# Patient Record
Sex: MALE | Race: WHITE | NOT HISPANIC OR LATINO | Employment: FULL TIME | URBAN - METROPOLITAN AREA
[De-identification: names, ages, dates, MRNs, and addresses within clinical notes are randomized per-mention and may not be internally consistent; named-entity substitution may affect disease eponyms.]

---

## 2018-09-11 DIAGNOSIS — Z00.00 ROUTINE GENERAL MEDICAL EXAMINATION AT A HEALTH CARE FACILITY: Primary | ICD-10-CM

## 2018-09-27 ENCOUNTER — OFFICE VISIT (OUTPATIENT)
Dept: PULMONOLOGY | Facility: CLINIC | Age: 28
End: 2018-09-27
Payer: COMMERCIAL

## 2018-09-27 ENCOUNTER — HOSPITAL ENCOUNTER (OUTPATIENT)
Dept: RADIOLOGY | Facility: HOSPITAL | Age: 28
Discharge: HOME | End: 2018-09-27
Attending: INTERNAL MEDICINE
Payer: COMMERCIAL

## 2018-09-27 ENCOUNTER — CLINICAL SUPPORT (OUTPATIENT)
Dept: INTERNAL MEDICINE | Facility: CLINIC | Age: 28
End: 2018-09-27
Payer: COMMERCIAL

## 2018-09-27 ENCOUNTER — HOSPITAL ENCOUNTER (OUTPATIENT)
Dept: CARDIOLOGY | Facility: CLINIC | Age: 28
Discharge: HOME | End: 2018-09-27
Payer: COMMERCIAL

## 2018-09-27 VITALS
HEIGHT: 70 IN | BODY MASS INDEX: 26.34 KG/M2 | WEIGHT: 184 LBS | SYSTOLIC BLOOD PRESSURE: 121 MMHG | DIASTOLIC BLOOD PRESSURE: 72 MMHG | HEART RATE: 76 BPM

## 2018-09-27 DIAGNOSIS — Z00.00 ANNUAL PHYSICAL EXAM: Primary | ICD-10-CM

## 2018-09-27 DIAGNOSIS — Z00.00 ROUTINE GENERAL MEDICAL EXAMINATION AT A HEALTH CARE FACILITY: ICD-10-CM

## 2018-09-27 DIAGNOSIS — Z00.00 ROUTINE GENERAL MEDICAL EXAMINATION AT A HEALTH CARE FACILITY: Primary | ICD-10-CM

## 2018-09-27 LAB
ALBUMIN SERPL BCP-MCNC: 4.2 G/DL
ALP SERPL-CCNC: 69 U/L
ALT SERPL W/O P-5'-P-CCNC: 32 U/L
ANION GAP SERPL CALC-SCNC: 6 MMOL/L
AST SERPL-CCNC: 25 U/L
BILIRUB SERPL-MCNC: 0.6 MG/DL
BUN SERPL-MCNC: 14 MG/DL
CALCIUM SERPL-MCNC: 10 MG/DL
CHLORIDE SERPL-SCNC: 105 MMOL/L
CHOLEST SERPL-MCNC: 193 MG/DL
CHOLEST/HDLC SERPL: 3.1 {RATIO}
CO2 SERPL-SCNC: 30 MMOL/L
CREAT SERPL-MCNC: 1 MG/DL
ERYTHROCYTE [DISTWIDTH] IN BLOOD BY AUTOMATED COUNT: 12 %
EST. GFR  (AFRICAN AMERICAN): >60 ML/MIN/1.73 M^2
EST. GFR  (NON AFRICAN AMERICAN): >60 ML/MIN/1.73 M^2
ESTIMATED AVG GLUCOSE: 97 MG/DL
GLUCOSE SERPL-MCNC: 100 MG/DL
HBA1C MFR BLD HPLC: 5 %
HCT VFR BLD AUTO: 41.7 %
HDLC SERPL-MCNC: 63 MG/DL
HDLC SERPL: 32.6 %
HGB BLD-MCNC: 14 G/DL
HIV 1+2 AB+HIV1 P24 AG SERPL QL IA: NEGATIVE
LDLC SERPL CALC-MCNC: 119.2 MG/DL
MCH RBC QN AUTO: 30.4 PG
MCHC RBC AUTO-ENTMCNC: 33.6 G/DL
MCV RBC AUTO: 91 FL
NONHDLC SERPL-MCNC: 130 MG/DL
PLATELET # BLD AUTO: 217 K/UL
PMV BLD AUTO: 11.6 FL
POTASSIUM SERPL-SCNC: 4.7 MMOL/L
PROT SERPL-MCNC: 7.2 G/DL
RBC # BLD AUTO: 4.61 M/UL
SODIUM SERPL-SCNC: 141 MMOL/L
TRIGL SERPL-MCNC: 54 MG/DL
TSH SERPL DL<=0.005 MIU/L-ACNC: 1.96 UIU/ML
WBC # BLD AUTO: 5.77 K/UL

## 2018-09-27 PROCEDURE — 93000 ELECTROCARDIOGRAM COMPLETE: CPT | Mod: S$GLB,,, | Performed by: INTERNAL MEDICINE

## 2018-09-27 PROCEDURE — 80061 LIPID PANEL: CPT

## 2018-09-27 PROCEDURE — 71046 X-RAY EXAM CHEST 2 VIEWS: CPT | Mod: TC,FY

## 2018-09-27 PROCEDURE — 71046 X-RAY EXAM CHEST 2 VIEWS: CPT | Mod: 26,,, | Performed by: RADIOLOGY

## 2018-09-27 PROCEDURE — 99999 PR PBB SHADOW E&M-EST. PATIENT-LVL III: CPT | Mod: PBBFAC,,, | Performed by: INTERNAL MEDICINE

## 2018-09-27 PROCEDURE — 99385 PREV VISIT NEW AGE 18-39: CPT | Mod: S$GLB,,, | Performed by: INTERNAL MEDICINE

## 2018-09-27 PROCEDURE — 86703 HIV-1/HIV-2 1 RESULT ANTBDY: CPT

## 2018-09-27 PROCEDURE — 83036 HEMOGLOBIN GLYCOSYLATED A1C: CPT

## 2018-09-27 PROCEDURE — 85027 COMPLETE CBC AUTOMATED: CPT

## 2018-09-27 PROCEDURE — 80053 COMPREHEN METABOLIC PANEL: CPT

## 2018-09-27 PROCEDURE — 84443 ASSAY THYROID STIM HORMONE: CPT

## 2018-09-27 NOTE — LETTER
September 27, 2018    Stephan Schwartz  [address] [facility] [address]             [facility] - Pulmonary Services  [address]  [place] [address]  Phone: [phone] Dear Stephan,        Thank you for allowing me to serve you and perform your Executive Health exam on 9/27/2018. This letter will serve as a brief summary of the physical findings and laboratory/studies performed and recommendations at this time. Today's assessment is normal in all respects. Tell your fiance about her physical with [facility].  Read the Book.         If you have any questions or concerns, please don't hesitate to call.    Sincerely,        Bhargav Wilson MD

## 2018-09-27 NOTE — PROGRESS NOTES
Subjective:       Patient ID: Stephan Schwartz is a 27 y.o. male.    Chief Complaint: Annual Exam    HPI  28 yo [employer] employee works in [employer] in [place]. Went to high school in [place], and later [employer] and graduated in [place]. He feels well, exercises regularly, takes no mediations and has never been hospitalized.   Review of Systems   Constitutional: Negative.    HENT: Negative.    Eyes: Negative.    Respiratory: Negative.    Cardiovascular: Negative.    Gastrointestinal: Negative.    Genitourinary: Negative.    Musculoskeletal: Negative.    Skin: Negative.    Neurological: Negative.    Psychiatric/Behavioral: Negative.    All other systems reviewed and are negative.      Objective:      Physical Exam   Constitutional: He is oriented to person, place, and time. He appears well-developed and well-nourished.   HENT:   Head: Normocephalic and atraumatic.   Right Ear: External ear normal.   Left Ear: External ear normal.   Eyes: Conjunctivae and EOM are normal. Pupils are equal, round, and reactive to light.   Neck: Normal range of motion. Neck supple.   Cardiovascular: Normal rate, regular rhythm and normal heart sounds.   Pulmonary/Chest: Effort normal and breath sounds normal.   Peak flow 600 l/min   Abdominal: Soft. Bowel sounds are normal.   Musculoskeletal: Normal range of motion.   Neurological: He is alert and oriented to person, place, and time. He has normal reflexes.   Skin: Skin is warm and dry.   Psychiatric: He has a normal mood and affect. His behavior is normal. Judgment and thought content normal.       Assessment:       No diagnosis found.    Plan:           Labs: All parameters are normal. EKG is normal an d Chest x-ray is clear. IMP: Healthy Male with good health habits.

## 2019-05-07 DIAGNOSIS — Z00.00 ROUTINE GENERAL MEDICAL EXAMINATION AT A HEALTH CARE FACILITY: Primary | ICD-10-CM

## 2019-06-28 ENCOUNTER — CLINICAL SUPPORT (OUTPATIENT)
Dept: INTERNAL MEDICINE | Facility: CLINIC | Age: 29
End: 2019-06-28
Payer: COMMERCIAL

## 2019-06-28 ENCOUNTER — OFFICE VISIT (OUTPATIENT)
Dept: INTERNAL MEDICINE | Facility: CLINIC | Age: 29
End: 2019-06-28
Payer: COMMERCIAL

## 2019-06-28 ENCOUNTER — HOSPITAL ENCOUNTER (OUTPATIENT)
Dept: CARDIOLOGY | Facility: CLINIC | Age: 29
Discharge: HOME | End: 2019-06-28
Payer: COMMERCIAL

## 2019-06-28 VITALS
WEIGHT: 187.19 LBS | SYSTOLIC BLOOD PRESSURE: 120 MMHG | TEMPERATURE: 98 F | DIASTOLIC BLOOD PRESSURE: 78 MMHG | HEART RATE: 73 BPM | BODY MASS INDEX: 27.73 KG/M2 | HEIGHT: 69 IN

## 2019-06-28 DIAGNOSIS — Z00.00 ANNUAL PHYSICAL EXAM: Primary | ICD-10-CM

## 2019-06-28 DIAGNOSIS — Z00.00 ROUTINE GENERAL MEDICAL EXAMINATION AT A HEALTH CARE FACILITY: Primary | ICD-10-CM

## 2019-06-28 DIAGNOSIS — Z00.00 ROUTINE GENERAL MEDICAL EXAMINATION AT A HEALTH CARE FACILITY: ICD-10-CM

## 2019-06-28 LAB
ALBUMIN SERPL BCP-MCNC: 4.3 G/DL (ref 3.5–5.2)
ALP SERPL-CCNC: 72 U/L (ref 55–135)
ALT SERPL W/O P-5'-P-CCNC: 27 U/L (ref 10–44)
ANION GAP SERPL CALC-SCNC: 7 MMOL/L (ref 8–16)
AST SERPL-CCNC: 19 U/L (ref 10–40)
BILIRUB SERPL-MCNC: 0.4 MG/DL (ref 0.1–1)
BUN SERPL-MCNC: 16 MG/DL (ref 6–20)
CALCIUM SERPL-MCNC: 10.3 MG/DL (ref 8.7–10.5)
CHLORIDE SERPL-SCNC: 106 MMOL/L (ref 95–110)
CHOLEST SERPL-MCNC: 210 MG/DL (ref 120–199)
CHOLEST/HDLC SERPL: 3.4 {RATIO} (ref 2–5)
CO2 SERPL-SCNC: 32 MMOL/L (ref 23–29)
CREAT SERPL-MCNC: 1.1 MG/DL (ref 0.5–1.4)
ERYTHROCYTE [DISTWIDTH] IN BLOOD BY AUTOMATED COUNT: 12.1 % (ref 11.5–14.5)
EST. GFR  (AFRICAN AMERICAN): >60 ML/MIN/1.73 M^2
EST. GFR  (NON AFRICAN AMERICAN): >60 ML/MIN/1.73 M^2
ESTIMATED AVG GLUCOSE: 97 MG/DL (ref 68–131)
GLUCOSE SERPL-MCNC: 111 MG/DL (ref 70–110)
HBA1C MFR BLD HPLC: 5 % (ref 4–5.6)
HCT VFR BLD AUTO: 43 % (ref 40–54)
HDLC SERPL-MCNC: 62 MG/DL (ref 40–75)
HDLC SERPL: 29.5 % (ref 20–50)
HGB BLD-MCNC: 14.2 G/DL (ref 14–18)
LDLC SERPL CALC-MCNC: 131.4 MG/DL (ref 63–159)
MCH RBC QN AUTO: 30.7 PG (ref 27–31)
MCHC RBC AUTO-ENTMCNC: 33 G/DL (ref 32–36)
MCV RBC AUTO: 93 FL (ref 82–98)
NONHDLC SERPL-MCNC: 148 MG/DL
PLATELET # BLD AUTO: 215 K/UL (ref 150–350)
PMV BLD AUTO: 11.9 FL (ref 9.2–12.9)
POTASSIUM SERPL-SCNC: 4.9 MMOL/L (ref 3.5–5.1)
PROT SERPL-MCNC: 7.3 G/DL (ref 6–8.4)
RBC # BLD AUTO: 4.62 M/UL (ref 4.6–6.2)
SODIUM SERPL-SCNC: 145 MMOL/L (ref 136–145)
TRIGL SERPL-MCNC: 83 MG/DL (ref 30–150)
TSH SERPL DL<=0.005 MIU/L-ACNC: 2.65 UIU/ML (ref 0.4–4)
WBC # BLD AUTO: 6.76 K/UL (ref 3.9–12.7)

## 2019-06-28 PROCEDURE — 80053 COMPREHEN METABOLIC PANEL: CPT

## 2019-06-28 PROCEDURE — 36415 COLL VENOUS BLD VENIPUNCTURE: CPT

## 2019-06-28 PROCEDURE — 84443 ASSAY THYROID STIM HORMONE: CPT

## 2019-06-28 PROCEDURE — 93005 ELECTROCARDIOGRAM TRACING: CPT | Mod: PBBFAC | Performed by: INTERNAL MEDICINE

## 2019-06-28 PROCEDURE — 99385 PREV VISIT NEW AGE 18-39: CPT | Mod: S$GLB,,, | Performed by: INTERNAL MEDICINE

## 2019-06-28 PROCEDURE — 83036 HEMOGLOBIN GLYCOSYLATED A1C: CPT

## 2019-06-28 PROCEDURE — 99385 PR PREVENTIVE VISIT,NEW,18-39: ICD-10-PCS | Mod: S$GLB,,, | Performed by: INTERNAL MEDICINE

## 2019-06-28 PROCEDURE — 93010 EKG 12-LEAD: ICD-10-PCS | Mod: S$PBB,,, | Performed by: INTERNAL MEDICINE

## 2019-06-28 PROCEDURE — 93010 ELECTROCARDIOGRAM REPORT: CPT | Mod: S$PBB,,, | Performed by: INTERNAL MEDICINE

## 2019-06-28 PROCEDURE — 97750 PR PHYSICAL PERFORMANCE TEST: ICD-10-PCS | Mod: S$GLB,,, | Performed by: INTERNAL MEDICINE

## 2019-06-28 PROCEDURE — 85027 COMPLETE CBC AUTOMATED: CPT

## 2019-06-28 PROCEDURE — 99999 PR PBB SHADOW E&M-EST. PATIENT-LVL III: ICD-10-PCS | Mod: PBBFAC,,, | Performed by: INTERNAL MEDICINE

## 2019-06-28 PROCEDURE — 97802 MEDICAL NUTRITION INDIV IN: CPT | Mod: S$GLB,,, | Performed by: INTERNAL MEDICINE

## 2019-06-28 PROCEDURE — 80061 LIPID PANEL: CPT

## 2019-06-28 PROCEDURE — 99999 PR PBB SHADOW E&M-EST. PATIENT-LVL III: CPT | Mod: PBBFAC,,, | Performed by: INTERNAL MEDICINE

## 2019-06-28 PROCEDURE — 97750 PHYSICAL PERFORMANCE TEST: CPT | Mod: S$GLB,,, | Performed by: INTERNAL MEDICINE

## 2019-06-28 PROCEDURE — 97802 PR MED NUTR THER, 1ST, INDIV, EA 15 MIN: ICD-10-PCS | Mod: S$GLB,,, | Performed by: INTERNAL MEDICINE

## 2019-06-28 NOTE — PROGRESS NOTES
"Subjective:       Patient ID: Stephan Schwartz is a 28 y.o. male.    Chief Complaint: No chief complaint on file.    HPI   Pt. Has no significant cardiovascular or pulmonary history.    Physical Limitations:  None.      Current exercise routine:  Patient currently boxes for an hour, 4 days a week.  Patient runs for a minimum of 30 minutes, 2 days a week.  Patient rows for 30 minutes, 2 days a week.  Patient performs full-body resistance training exercises, 2 days a week.  Patient does not follow any formal flexibility routine at the current time.    Goals:  Patient would like to maintain his current weight and body fat %.    Fun Facts:  Patient was very friendly and engaged.  Patient stated that he does not follow any formal routine but is clearly very active and stated that he "sweats every day of the week".  Patient was receptive to all recommendations made.      Review of Systems    Objective:     The fitness evaluation results are as follows:  D.O.S. 6/28/2019   Height (in): 69.5   Weight (lbs): 185   BMI: 26.025159   Body Fat (%): 14.51   Waist (cm): 89   Hip (cm): 104   WHR: 0.86   RBP (mmHg): 136/92   RHR (bpm): 62    Strength R (lbs)t: 111.29246    Strength Lt (lbs): 106.53499   Push-up Assessment: 56   Curl-up Assessment: 75   Flexibility Testing (cm): 17   REE (kcals): 2670       Physical Exam    Assessment:     Age/gender stratified assessment:  Resting BP: Elevated   Body Fat %: Excellent   WHR Risk Factor: Low Risk    Strength R: Average    Strength L: Average   Upper Body Endurance: Excellent   Abdominal Endurance: Well Above Average   Lower body Flexibiltiy: Needs Improvement       1. Routine general medical examination at a health care facility        Plan:       Recommended fitness guidelines:    -150 minutes of moderate intensity aerobic exercise per week or 75 minutes of vigorous intensity aerobic exercise per week.    -2 to 4 days per week of resistance training for each muscle group. "      -Daily stretching with a hold of at least 30 seconds per muscle group.  Practice the seated hamstring stretch, demonstrated during the evaluation, daily.

## 2019-06-28 NOTE — PROGRESS NOTES
"Nutrition Assessment  Client name:  Stephan Schwartz  :  1990  Age:  28 y.o.  Gender:  male    Client states:  Very pleasant [employer] employee here for his annual Executive Health physical.  Single although engaged.  Has an unremarkable PMH and no medical complaints this morning.  Takes no routine rx medications or supplements daily.  Noted that he did not fast for the recommended duration last night, which he was told may have affected his FBS this morning.  Recalls previous blood draws, in which his cholesterol was borderline high.  Questions etiology, noting weekly intake of fatty meat in particular.  Typically skips breakfast followed by a grilled chicken salad for lunch, almonds for mid-afternoon snack, home cooked meal for dinner, and fruit for night time snack.  Fiance prepares the majority of their meals at night and includes vegetables with most, if not all meals.  Indulges in alcohol with dinner, inquiring about recommended serving sizes.  Also, struggles with his sweet tooth, sharing that he has tried multiple healthier alternatives, such as Halo Top ice cream, although recently switched to fruit.  Believes, however, that he may eat too much fruit, referencing his intake to the equivalence of three bananas.  Adds that his caffeine intake may be considered "excessive" as he consumes 4 Yeti tumblers of coffee daily.  Commutes to [place] for work and so, "needs" coffee for his drive there and back in order to stay awake and alert.  Inquired about recommended oil intake as he and his fiance cook mostly with olive oil.  Regarding exercise, performs various cardio and strength training activities 4x/week on average although admits that his exercise consistency has been less this year due to life events.  Overall, desires to improve lipid panel via improved food and beverage choices.      Anthropometrics  Height:  5' 9.5"     Weight:  185#  BMI:  26.9  % Body Fat:  14.51%    Clinical Signs/Symptoms  N/V/D:  " "None  Appetite (Good, Fair, or Poor):  Good      No past medical history on file.    No past surgical history on file.    Medications    currently has no medications in their medication list.    Vitamins, Minerals, and/or Supplements:  None     Food/Medication Interactions:  Reviewed     Food Allergies or Intolerances:  NKFA     Social History    Marital status:  Engaged  Employment:  [employer]    Social History     Tobacco Use    Smoking status: Never Smoker    Smokeless tobacco: Never Used   Substance Use Topics    Alcohol use: Yes     Frequency: 2-4 times a month     Comment: occasional        Lab Reports   Total Cholesterol:  210    Triglycerides:  83  HDL:  62  LDL:  131.4   Glucose:  111  HbA1c:  5%  BP:  120/78     Food History  Breakfast:  Coffee/skips  Mid-morning Snack:  None  Lunch:  Salad with grilled chicken + water or unsweet tea  Mid-afternoon Snack:  +/- Almonds  Dinner:  Asian chicken medley + [place] sprouts + alcohol (ex. liquor or wine)  H.S. Snack:  Fruit  *Fluid intake:  ~ 48 oz coffee, water, unsweet tea, alcohol    Exercise History:  ~60 minutes various cardio and strength training activities 4x/week    Cultural/Spiritual/Personal Preferences:  None identified    Support System:  [facility]     State of Change:  Contemplation    Barriers to Change:  None    Diagnosis    Altered nutrition-related laboratory values related to improper food and beverage choices as evidenced by TC:  210; LDL:  131.4.    Intervention    RMR (Method:  Body Crawford):  2670 kcal  Activity Factor:  1.3  JASON:  3471 - 250 = 3221 kcal    Goals:  1.  TC < 200; LDL < 130  2.  Maintain a healthy body weight  3.  Select mostly lean meats, such as lean ground beef, "loin" and "round" cuts of pork and beef, flank steak, center cut pork chops, chicken, etc.  4.  Increase vegetable intake to 1/2 plate with dinner  5.  Limit alcohol intake to no more than 2 drinks/day (1 drink = 1.5 oz liquor, 5 oz wine, 12 oz beer)  6.  Reduce caffeine " intake to 3 tumblers/day, eventually decreasing to 2 tumblers/day or less    Nutrition Education  Reviewed CMP, lipid panel, and HbA1c, noting borderline high TC and LDL in particular, which may be attributed to food choices.  Noted borderline high FBS, although accompanied by optimal HbA1c, indicating a low risk for DM.  Explained such to patient in addition to discussing heart-healthy eating, including foods recommended and not, food sources of cholesterol, different types of fats and food sources of each, benefits of physical activity, potential health consequences of hyperlipidemia, etc.  Reviewed list of lean meats within [facility] Meal Planning Guide, encouraging selection of such in place of fatty meats.  Furthermore, addressed patient's concerns regarding sugar and caffeine intake, encouraging avoidance of meal skipping and increased vegetable intake with dinner for added fiber, satiety, and reduction in night time snacking.  Provided healthier dessert alternatives per patient request.  Reviewed recommended caffeine intake and the potential health consequences of excessive intake, encouraging gradual reduction to 2 tumblers/day or less.  Answered patient's questions regarding oil and alcohol intake, advising limit to 2 drinks/day or less.  Reviewed recommended serving sizes of various alcoholic beverages.  Offered patient additional handouts regarding a heart-healthy lifestyle although patient politely declined, stating his commitment to the goals listed above.    Patient verbalized understanding of nutrition education and recommendations received.    Handouts Provided  Meal Planning Guide  Restaurant Guide  Eat Fit Shopping List  Eat Fit Beatriz  Fast Food Guide  Vitamin/Mineral Guide    Monitoring/Evaluation    Monitor the following:  Weight  BMI  Caloric and fluid intake  Lipid panel    Follow Up Plan:  Communication with referring healthcare provider is unnecessary at this time as patient presented as part  of annual wellness exam.  However, will follow up with patient in 1-2 years.

## 2019-06-28 NOTE — LETTER
June 28, 2019    Stephan Schwartz  [address] [facility] [address]             [facility] - Internal Medicine  [address]  [place] [address]  Phone: [phone]  Fax: [phone] Dear Mr. Schwartz:    Thank you for allowing me to serve you and perform your Executive Health exam on 6/28/2019.  This letter will serve a brief summary of the history, physical findings, and laboratory/studies performed and recommendations at that time.    Reason for Visit: Executive Health Preventive Physical Examination    History reviewed. No pertinent past medical history.    History reviewed. No pertinent surgical history.    Family History   Problem Relation Age of Onset    No Known Problems Mother     No Known Problems Father        Social History     Socioeconomic History    Marital status: Single     Spouse name: Not on file    Number of children: Not on file    Years of education: Not on file    Highest education level: Not on file   Occupational History    Occupation: finance   Social Needs    Financial resource strain: Not on file    Food insecurity:     Worry: Not on file     Inability: Not on file    Transportation needs:     Medical: Not on file     Non-medical: Not on file   Tobacco Use    Smoking status: Never Smoker    Smokeless tobacco: Never Used   Substance and Sexual Activity    Alcohol use: Yes     Frequency: 2-4 times a month     Comment: occasional    Drug use: Never    Sexual activity: Not on file   Lifestyle    Physical activity:     Days per week: Not on file     Minutes per session: Not on file    Stress: Not on file   Relationships    Social connections:     Talks on phone: Not on file     Gets together: Not on file     Attends Ep[employer]alian service: Not on file     Active member of club or organization: Not on file     Attends meetings of clubs or organizations: Not on file     Relationship status: Not on file   Other Topics Concern    Not on file   Social History Narrative     Exercises regularly - body weights, aerobics; doing something daily.         Review of patient's allergies indicates:  No Known Allergies    No current outpatient medications on file.     Review of Systems  Review of Systems - Negative    Physical Exam:  General: General appearance: alert, well appearing, and in no distress.   Skin: Skin exam - normal coloration and turgor, no rashes, no suspicious skin lesions noted.  HEENT: Ears - bilateral TM's and external ear canals normal. , ENT exam reveals - ENT exam normal, no neck nodes or sinus tenderness.   Lungs: Chest: clear to auscultation, no wheezes, rales or rhonchi, symmetric air entry.   Heart: CVS exam: normal rate, regular rhythm, normal S1, S2, no murmurs, rubs, clicks or gallops.   Extremities: Exam of extremities: peripheral pulses normal, no pedal edema, no clubbing or cyanosis    Labs:  Results for orders placed or performed in visit on 06/28/19   Comprehensive metabolic panel   Result Value Ref Range    Sodium 145 136 - 145 mmol/L    Potassium 4.9 3.5 - 5.1 mmol/L    Chloride 106 95 - 110 mmol/L    CO2 32 (H) 23 - 29 mmol/L    Glucose 111 (H) 70 - 110 mg/dL    BUN, Bld 16 6 - 20 mg/dL    Creatinine 1.1 0.5 - 1.4 mg/dL    Calcium 10.3 8.7 - 10.5 mg/dL    Total Protein 7.3 6.0 - 8.4 g/dL    Albumin 4.3 3.5 - 5.2 g/dL    Total Bilirubin 0.4 0.1 - 1.0 mg/dL    Alkaline Phosphatase 72 55 - 135 U/L    AST 19 10 - 40 U/L    ALT 27 10 - 44 U/L    Anion Gap 7 (L) 8 - 16 mmol/L    eGFR if African American >60.0 >60 mL/min/1.73 m^2    eGFR if non African American >60.0 >60 mL/min/1.73 m^2   CBC Without Differential   Result Value Ref Range    WBC 6.76 3.90 - 12.70 K/uL    RBC 4.62 4.60 - 6.20 M/uL    Hemoglobin 14.2 14.0 - 18.0 g/dL    Hematocrit 43.0 40.0 - 54.0 %    Mean Corpuscular Volume 93 82 - 98 fL    Mean Corpuscular Hemoglobin 30.7 27.0 - 31.0 pg    Mean Corpuscular Hemoglobin Conc 33.0 32.0 - 36.0 g/dL    RDW 12.1 11.5 - 14.5 %    Platelets 215 150 - 350  "K/uL    MPV 11.9 9.2 - 12.9 fL   Lipid panel   Result Value Ref Range    Cholesterol 210 (H) 120 - 199 mg/dL    Triglycerides 83 30 - 150 mg/dL    HDL 62 40 - 75 mg/dL    LDL Cholesterol 131.4 63.0 - 159.0 mg/dL    Hdl/Cholesterol Ratio 29.5 20.0 - 50.0 %    Total Cholesterol/HDL Ratio 3.4 2.0 - 5.0    Non-HDL Cholesterol 148 mg/dL   Hemoglobin A1c   Result Value Ref Range    Hemoglobin A1C 5.0 4.0 - 5.6 %    Estimated Avg Glucose 97 68 - 131 mg/dL   TSH   Result Value Ref Range    TSH 2.646 0.400 - 4.000 uIU/mL        Assessment/Recommendations:  Routine Health Maintenance    At this time, you appear to be in good medical condition.  As discussed, your "fasting" sugar and cholesterol were probably high due to not fasting the full 10 hours prior to the lab test.  Your a1c, average sugar over 3 months, is in the normal range.  Keep exercising and watching diet!    If you have any questions or concerns, please don't hesitate to call.    Sincerely,          Shirley Rockwell MD     "

## 2019-06-28 NOTE — PROGRESS NOTES
"INTERNAL MEDICINE INITIAL VISIT NOTE      CHIEF COMPLAINT     Chief Complaint   Patient presents with    [facility]       HPI     Stephan Schwartz is a 28 y.o. C male who presents for [employer].    No complaints.     Past Medical History:  History reviewed. No pertinent past medical history.    Past Surgical History:  History reviewed. No pertinent surgical history.    Allergies:  Review of patient's allergies indicates:  No Known Allergies    Home Medications:  Prior to Admission medications    Not on File       Family History:  Family History   Problem Relation Age of Onset    No Known Problems Mother     No Known Problems Father        Social History:  Social History     Tobacco Use    Smoking status: Never Smoker    Smokeless tobacco: Never Used   Substance Use Topics    Alcohol use: Yes     Frequency: 2-4 times a month     Comment: occasional    Drug use: Never       Review of Systems:  Review of Systems Comprehensive review of systems otherwise negative. See history/subjective section for more details.    Health Maintainence:   Td - thinks w/in last 10 yrs.     PHYSICAL EXAM     /78 (BP Location: Left arm, Patient Position: Sitting, BP Method: Large (Manual))   Pulse 73   Temp 98.4 °F (36.9 °C)   Ht 5' 9" (1.753 m)   Wt 84.9 kg (187 lb 2.7 oz)   BMI 27.64 kg/m²     GEN - A+OX4, NAD   HEENT - PERRL, EOMI, OP clear. MMM.   Neck - No thyromegaly or cervical LAD. No thyroid masses felt.  CV - RRR, no m/r   Chest - CTAB, no wheezing or rhonchi  Abd - S/NT/ND/+BS.   Ext - 2+BDP and radial pulses. No LE edema.   Neuro - 5/5 BUE and BLE strength. Sensation to light touch intact throughout. 2+ DTRs. Normal gait.   MSK - No spinal tenderness to palpation. Normal gait.   Skin - No rash.    LABS     Previous labs reviewed.    ASSESSMENT/PLAN     Stephan Schwartz is a 28 y.o. male with  Stephan was seen today for 1010data.    Diagnoses and all orders for this visit:    Annual physical " exam  Not truly fasting for full 10 hours for test, which likely accounts for the 111 for glucose and mildly elevated total cholesterol. rec continuing exercise.       RTC in 12 months, sooner if needed and depending on labs.    Shirley Rockwell MD  Department of Internal Medicine - [facility]  9:18 AM

## 2020-06-02 DIAGNOSIS — Z00.00 ROUTINE GENERAL MEDICAL EXAMINATION AT A HEALTH CARE FACILITY: Primary | ICD-10-CM

## 2020-08-28 ENCOUNTER — CLINICAL SUPPORT (OUTPATIENT)
Dept: INTERNAL MEDICINE | Facility: CLINIC | Age: 30
End: 2020-08-28
Payer: COMMERCIAL

## 2020-08-28 ENCOUNTER — OFFICE VISIT (OUTPATIENT)
Dept: INTERNAL MEDICINE | Facility: CLINIC | Age: 30
End: 2020-08-28
Payer: COMMERCIAL

## 2020-08-28 ENCOUNTER — HOSPITAL ENCOUNTER (OUTPATIENT)
Dept: CARDIOLOGY | Facility: CLINIC | Age: 30
Discharge: HOME | End: 2020-08-28
Payer: COMMERCIAL

## 2020-08-28 VITALS
WEIGHT: 188.69 LBS | HEART RATE: 61 BPM | SYSTOLIC BLOOD PRESSURE: 128 MMHG | BODY MASS INDEX: 27.01 KG/M2 | DIASTOLIC BLOOD PRESSURE: 84 MMHG | HEIGHT: 70 IN | TEMPERATURE: 98 F

## 2020-08-28 DIAGNOSIS — Z00.00 ROUTINE GENERAL MEDICAL EXAMINATION AT A HEALTH CARE FACILITY: Primary | ICD-10-CM

## 2020-08-28 DIAGNOSIS — Z00.00 ANNUAL PHYSICAL EXAM: Primary | ICD-10-CM

## 2020-08-28 DIAGNOSIS — Z00.00 ROUTINE GENERAL MEDICAL EXAMINATION AT A HEALTH CARE FACILITY: ICD-10-CM

## 2020-08-28 DIAGNOSIS — Z00.00 ENCOUNTER FOR ANNUAL HEALTH EXAMINATION: Primary | ICD-10-CM

## 2020-08-28 LAB
ALBUMIN SERPL BCP-MCNC: 4.2 G/DL (ref 3.5–5.2)
ALP SERPL-CCNC: 72 U/L (ref 55–135)
ALT SERPL W/O P-5'-P-CCNC: 23 U/L (ref 10–44)
ANION GAP SERPL CALC-SCNC: 8 MMOL/L (ref 8–16)
AST SERPL-CCNC: 21 U/L (ref 10–40)
BILIRUB SERPL-MCNC: 0.5 MG/DL (ref 0.1–1)
BUN SERPL-MCNC: 16 MG/DL (ref 6–20)
CALCIUM SERPL-MCNC: 9.3 MG/DL (ref 8.7–10.5)
CHLORIDE SERPL-SCNC: 104 MMOL/L (ref 95–110)
CHOLEST SERPL-MCNC: 220 MG/DL (ref 120–199)
CHOLEST/HDLC SERPL: 3.6 {RATIO} (ref 2–5)
CO2 SERPL-SCNC: 29 MMOL/L (ref 23–29)
CREAT SERPL-MCNC: 1 MG/DL (ref 0.5–1.4)
ERYTHROCYTE [DISTWIDTH] IN BLOOD BY AUTOMATED COUNT: 12.4 % (ref 11.5–14.5)
EST. GFR  (AFRICAN AMERICAN): >60 ML/MIN/1.73 M^2
EST. GFR  (NON AFRICAN AMERICAN): >60 ML/MIN/1.73 M^2
ESTIMATED AVG GLUCOSE: 97 MG/DL (ref 68–131)
GLUCOSE SERPL-MCNC: 102 MG/DL (ref 70–110)
HBA1C MFR BLD HPLC: 5 % (ref 4–5.6)
HCT VFR BLD AUTO: 42.1 % (ref 40–54)
HDLC SERPL-MCNC: 61 MG/DL (ref 40–75)
HDLC SERPL: 27.7 % (ref 20–50)
HGB BLD-MCNC: 14.5 G/DL (ref 14–18)
LDLC SERPL CALC-MCNC: 146.8 MG/DL (ref 63–159)
MCH RBC QN AUTO: 31 PG (ref 27–31)
MCHC RBC AUTO-ENTMCNC: 34.4 G/DL (ref 32–36)
MCV RBC AUTO: 90 FL (ref 82–98)
NONHDLC SERPL-MCNC: 159 MG/DL
PLATELET # BLD AUTO: 201 K/UL (ref 150–350)
PMV BLD AUTO: 11.4 FL (ref 9.2–12.9)
POTASSIUM SERPL-SCNC: 4.3 MMOL/L (ref 3.5–5.1)
PROT SERPL-MCNC: 7.2 G/DL (ref 6–8.4)
RBC # BLD AUTO: 4.68 M/UL (ref 4.6–6.2)
SODIUM SERPL-SCNC: 141 MMOL/L (ref 136–145)
TRIGL SERPL-MCNC: 61 MG/DL (ref 30–150)
TSH SERPL DL<=0.005 MIU/L-ACNC: 2.11 UIU/ML (ref 0.4–4)
WBC # BLD AUTO: 5.72 K/UL (ref 3.9–12.7)

## 2020-08-28 PROCEDURE — 99385 PREV VISIT NEW AGE 18-39: CPT | Mod: 25,S$GLB,, | Performed by: INTERNAL MEDICINE

## 2020-08-28 PROCEDURE — 90715 TDAP VACCINE 7 YRS/> IM: CPT | Mod: S$GLB,,, | Performed by: INTERNAL MEDICINE

## 2020-08-28 PROCEDURE — 99385 PR PREVENTIVE VISIT,NEW,18-39: ICD-10-PCS | Mod: 25,S$GLB,, | Performed by: INTERNAL MEDICINE

## 2020-08-28 PROCEDURE — 99999 PR PBB SHADOW E&M-EST. PATIENT-LVL I: ICD-10-PCS | Mod: PBBFAC,,,

## 2020-08-28 PROCEDURE — 80053 COMPREHEN METABOLIC PANEL: CPT

## 2020-08-28 PROCEDURE — 85027 COMPLETE CBC AUTOMATED: CPT

## 2020-08-28 PROCEDURE — 90715 TDAP VACCINE GREATER THAN OR EQUAL TO 7YO IM: ICD-10-PCS | Mod: S$GLB,,, | Performed by: INTERNAL MEDICINE

## 2020-08-28 PROCEDURE — 97750 PHYSICAL PERFORMANCE TEST: CPT | Mod: S$GLB,,, | Performed by: INTERNAL MEDICINE

## 2020-08-28 PROCEDURE — 90471 TDAP VACCINE GREATER THAN OR EQUAL TO 7YO IM: ICD-10-PCS | Mod: S$GLB,,, | Performed by: INTERNAL MEDICINE

## 2020-08-28 PROCEDURE — 93005 ELECTROCARDIOGRAM TRACING: CPT | Mod: S$GLB,,, | Performed by: INTERNAL MEDICINE

## 2020-08-28 PROCEDURE — 93005 EKG 12-LEAD: ICD-10-PCS | Mod: S$GLB,,, | Performed by: INTERNAL MEDICINE

## 2020-08-28 PROCEDURE — 99999 PR PBB SHADOW E&M-EST. PATIENT-LVL I: CPT | Mod: PBBFAC,,,

## 2020-08-28 PROCEDURE — 36415 COLL VENOUS BLD VENIPUNCTURE: CPT

## 2020-08-28 PROCEDURE — 93010 EKG 12-LEAD: ICD-10-PCS | Mod: S$GLB,,, | Performed by: INTERNAL MEDICINE

## 2020-08-28 PROCEDURE — 84443 ASSAY THYROID STIM HORMONE: CPT

## 2020-08-28 PROCEDURE — 90471 IMMUNIZATION ADMIN: CPT | Mod: S$GLB,,, | Performed by: INTERNAL MEDICINE

## 2020-08-28 PROCEDURE — 97802 MEDICAL NUTRITION INDIV IN: CPT | Mod: S$GLB,,, | Performed by: INTERNAL MEDICINE

## 2020-08-28 PROCEDURE — 97750 PR PHYSICAL PERFORMANCE TEST: ICD-10-PCS | Mod: S$GLB,,, | Performed by: INTERNAL MEDICINE

## 2020-08-28 PROCEDURE — 93010 ELECTROCARDIOGRAM REPORT: CPT | Mod: S$GLB,,, | Performed by: INTERNAL MEDICINE

## 2020-08-28 PROCEDURE — 99999 PR PBB SHADOW E&M-EST. PATIENT-LVL III: CPT | Mod: PBBFAC,,, | Performed by: INTERNAL MEDICINE

## 2020-08-28 PROCEDURE — 80061 LIPID PANEL: CPT

## 2020-08-28 PROCEDURE — 83036 HEMOGLOBIN GLYCOSYLATED A1C: CPT

## 2020-08-28 PROCEDURE — 99999 PR PBB SHADOW E&M-EST. PATIENT-LVL III: ICD-10-PCS | Mod: PBBFAC,,, | Performed by: INTERNAL MEDICINE

## 2020-08-28 PROCEDURE — 97802 PR MED NUTR THER, 1ST, INDIV, EA 15 MIN: ICD-10-PCS | Mod: S$GLB,,, | Performed by: INTERNAL MEDICINE

## 2020-08-28 NOTE — PROGRESS NOTES
Subjective:       Patient ID: Stephan Schwartz is a 29 y.o. male.    Chief Complaint: Annual Exam      HPI:  Here for annual health exam. No acute complaints. No previous major medical diagnoses.   Has multiple moles. Considering getting a Derm eval though no significant changes recently.    Tetanus booster likely over 10 yrs ago.  No family hx of colon ca.  HIV neg 2018    Review of Systems   Constitutional: Negative for fatigue, fever and unexpected weight change.   HENT: Negative for hearing loss and sinus pain.    Eyes: Negative for visual disturbance.   Respiratory: Negative for cough and shortness of breath.    Cardiovascular: Negative for chest pain and leg swelling.   Gastrointestinal: Negative for abdominal pain, diarrhea and nausea.   Genitourinary: Negative for difficulty urinating, dysuria and frequency.   Musculoskeletal: Negative for arthralgias and joint swelling.   Skin: Negative for rash and wound.   Neurological: Negative for dizziness, weakness and headaches.   Psychiatric/Behavioral: Negative for dysphoric mood. The patient is not nervous/anxious.        Past Medical History:   Diagnosis Date    Multiple nevi        No current outpatient medications on file.    History reviewed. No pertinent surgical history.    Family History   Problem Relation Age of Onset    No Known Problems Mother     No Known Problems Father        Social History     Tobacco Use    Smoking status: Never Smoker    Smokeless tobacco: Never Used   Substance Use Topics    Alcohol use: Yes     Comment: Occasional    Drug use: Never         Objective:      Vitals:    08/28/20 0956   BP: 128/84   Pulse: 61   Temp: 97.7 °F (36.5 °C)       Physical Exam  Constitutional:       General: He is not in acute distress.     Appearance: Normal appearance. He is well-developed. He is not ill-appearing.   HENT:      Head: Normocephalic and atraumatic.      Right Ear: Hearing and tympanic membrane normal. There is no impacted cerumen.       Left Ear: Hearing and external ear normal. There is impacted cerumen.   Eyes:      Extraocular Movements: Extraocular movements intact.      Conjunctiva/sclera: Conjunctivae normal.      Pupils: Pupils are equal, round, and reactive to light.   Cardiovascular:      Rate and Rhythm: Normal rate and regular rhythm.      Heart sounds: Normal heart sounds. No murmur.   Pulmonary:      Effort: Pulmonary effort is normal. No respiratory distress.      Breath sounds: Normal breath sounds.   Abdominal:      General: Abdomen is flat. There is no distension.      Palpations: Abdomen is soft.   Musculoskeletal:         General: No swelling or deformity.      Right lower leg: Edema present.      Left lower leg: No edema.   Skin:     General: Skin is warm and dry.      Findings: No rash.      Comments: Multiple scattered nevi and sking tags. No immediately concerning lesions noted.    Neurological:      General: No focal deficit present.      Mental Status: He is alert and oriented to person, place, and time.      Cranial Nerves: No cranial nerve deficit.      Coordination: Coordination normal.      Gait: Gait normal.      Deep Tendon Reflexes: Reflexes normal.   Psychiatric:         Mood and Affect: Mood normal.         Behavior: Behavior normal.         Thought Content: Thought content normal.         Judgment: Judgment normal.         Recent Results (from the past 2016 hour(s))   Comprehensive metabolic panel    Collection Time: 08/28/20  8:39 AM   Result Value Ref Range    Sodium 141 136 - 145 mmol/L    Potassium 4.3 3.5 - 5.1 mmol/L    Chloride 104 95 - 110 mmol/L    CO2 29 23 - 29 mmol/L    Glucose 102 70 - 110 mg/dL    BUN, Bld 16 6 - 20 mg/dL    Creatinine 1.0 0.5 - 1.4 mg/dL    Calcium 9.3 8.7 - 10.5 mg/dL    Total Protein 7.2 6.0 - 8.4 g/dL    Albumin 4.2 3.5 - 5.2 g/dL    Total Bilirubin 0.5 0.1 - 1.0 mg/dL    Alkaline Phosphatase 72 55 - 135 U/L    AST 21 10 - 40 U/L    ALT 23 10 - 44 U/L    Anion Gap 8 8 - 16 mmol/L     eGFR if African American >60.0 >60 mL/min/1.73 m^2    eGFR if non African American >60.0 >60 mL/min/1.73 m^2   CBC Without Differential    Collection Time: 08/28/20  8:39 AM   Result Value Ref Range    WBC 5.72 3.90 - 12.70 K/uL    RBC 4.68 4.60 - 6.20 M/uL    Hemoglobin 14.5 14.0 - 18.0 g/dL    Hematocrit 42.1 40.0 - 54.0 %    Mean Corpuscular Volume 90 82 - 98 fL    Mean Corpuscular Hemoglobin 31.0 27.0 - 31.0 pg    Mean Corpuscular Hemoglobin Conc 34.4 32.0 - 36.0 g/dL    RDW 12.4 11.5 - 14.5 %    Platelets 201 150 - 350 K/uL    MPV 11.4 9.2 - 12.9 fL   Lipid panel    Collection Time: 08/28/20  8:39 AM   Result Value Ref Range    Cholesterol 220 (H) 120 - 199 mg/dL    Triglycerides 61 30 - 150 mg/dL    HDL 61 40 - 75 mg/dL    LDL Cholesterol 146.8 63.0 - 159.0 mg/dL    Hdl/Cholesterol Ratio 27.7 20.0 - 50.0 %    Total Cholesterol/HDL Ratio 3.6 2.0 - 5.0    Non-HDL Cholesterol 159 mg/dL   Hemoglobin A1c    Collection Time: 08/28/20  8:39 AM   Result Value Ref Range    Hemoglobin A1C 5.0 4.0 - 5.6 %    Estimated Avg Glucose 97 68 - 131 mg/dL   TSH    Collection Time: 08/28/20  8:39 AM   Result Value Ref Range    TSH 2.112 0.400 - 4.000 uIU/mL      EKG: NSR with sinus arrythmia      Assessment/Plan:     1) Health Maintenance and Prevention:  - Annual flu vaccine  - Tdap updated today  - HIV neg 2018  - BP, BG wnl.    2) HLD - Mildly elevated LDL at 146. Discussed weight loss and dietary focuses with plan for repeat lipids in 1 year.    3) Multiple nevi - No immediately concerning lesions but agree Derm eval would be reasonable. He has particular a Dermatologist whom he will schedule with himself.    4) Left ear cerumen impaction - Try OTC Debrox. ENT if ineffective which he has done in the past.

## 2020-08-28 NOTE — LETTER
8/28/2020    Stephan Schwartz  [address] [place] [address]       [facility]  [address], [address]  [facility] [address]  Phone: [phone]  Fax: [phone] Dear Mr. Schwartz:    Thank you for allowing me to serve you and perform your Executive Health exam on 8/28/2020.  This letter will serve a brief summary of the history, findings and discussions at that time. I have included the lab and study results for you to have available at future healthcare appointments.      Reason for Visit: Executive Health Preventive Physical Examination      Past Medical History:  Past Medical History:   Diagnosis Date    Multiple nevi          Physical Exam: Earwax buildup in left ear. Otherwise no concerning findings on exam.      Labs:  Comprehensive metabolic panel    Collection Time: 08/28/20  8:39 AM   Result Value Ref Range    Sodium 141 136 - 145 mmol/L    Potassium 4.3 3.5 - 5.1 mmol/L    Chloride 104 95 - 110 mmol/L    CO2 29 23 - 29 mmol/L    Glucose 102 70 - 110 mg/dL    BUN, Bld 16 6 - 20 mg/dL    Creatinine 1.0 0.5 - 1.4 mg/dL    Calcium 9.3 8.7 - 10.5 mg/dL    Total Protein 7.2 6.0 - 8.4 g/dL    Albumin 4.2 3.5 - 5.2 g/dL    Total Bilirubin 0.5 0.1 - 1.0 mg/dL    Alkaline Phosphatase 72 55 - 135 U/L    AST 21 10 - 40 U/L    ALT 23 10 - 44 U/L    Anion Gap 8 8 - 16 mmol/L    eGFR if African American >60.0 >60 mL/min/1.73 m^2    eGFR if non African American >60.0 >60 mL/min/1.73 m^2   CBC Without Differential    Collection Time: 08/28/20  8:39 AM   Result Value Ref Range    WBC 5.72 3.90 - 12.70 K/uL    RBC 4.68 4.60 - 6.20 M/uL    Hemoglobin 14.5 14.0 - 18.0 g/dL    Hematocrit 42.1 40.0 - 54.0 %    Mean Corpuscular Volume 90 82 - 98 fL    Mean Corpuscular Hemoglobin 31.0 27.0 - 31.0 pg    Mean Corpuscular Hemoglobin Conc 34.4 32.0 - 36.0 g/dL    RDW 12.4 11.5 - 14.5 %    Platelets 201 150 - 350 K/uL    MPV 11.4 9.2 - 12.9 fL   Lipid panel    Collection Time:  08/28/20  8:39 AM   Result Value Ref Range    Cholesterol 220 (H) 120 - 199 mg/dL    Triglycerides 61 30 - 150 mg/dL    HDL 61 40 - 75 mg/dL    LDL Cholesterol 146.8 63.0 - 159.0 mg/dL    Hdl/Cholesterol Ratio 27.7 20.0 - 50.0 %    Total Cholesterol/HDL Ratio 3.6 2.0 - 5.0    Non-HDL Cholesterol 159 mg/dL   Hemoglobin A1c    Collection Time: 08/28/20  8:39 AM   Result Value Ref Range    Hemoglobin A1C 5.0 4.0 - 5.6 %    Estimated Avg Glucose 97 68 - 131 mg/dL   TSH    Collection Time: 08/28/20  8:39 AM   Result Value Ref Range    TSH 2.112 0.400 - 4.000 uIU/mL       EKG: Normal sinus rhythm with sinus arrythmia (normal finding).      Assessment/Recommendations:    Health Maintenance and Prevention:  - Annual flu vaccine.  - Tetanus booster updated at our visit. Repeat every 10 years.    - HIV screen was negative in 2018.    - Blood pressure and blood sugar are at normal levels.       High cholesterol - Mild elevation. Dietary and weight focus with plan for repeat cholesterol levels in 1 year would be very reasonable follow up.     Multiple nevi (moles) - No immediately concerning lesions but I agree that Dermatology evaluation would be reasonable.     Left earwax impaction - Try over the counter Debrox drops. Consider ENT follow up if ineffective.      It was a pleasure meeting you for your health exam, Mr. Schwartz.    If you have any questions or concerns, please don't hesitate to call.    Sincerely,    Jeffery Mora MD

## 2020-08-28 NOTE — PROGRESS NOTES
Subjective:       Patient ID: Stephan Schwartz is a 29 y.o. male.    Chief Complaint: Executive Health    HPI  Review of Systems    Mr. Schwartz reports no hx of cardiovascular or pulmonary disease. He reports no limitations to physical activity. He reports decreased activity levels the last few weeks during quarantine, but is usually active most days of the week. Typical routine includes:     - Cardiovascular: Currently boxing 4-7x/wk for 30-60 min each session and running.      - Musculoskeletal: Performing free-weight exercises 5x/wk.    Objective:       D.O.S. 828/2020 6/28/2019   Height (in): 70 69.5   Weight (lbs): 187.3 185   BMI: 26.72573 26.388061   Body Fat (%): 23.40 14.51   Waist (cm): 90.5 89   Hip (cm): 106 104   WHR: 0.85 0.86   RBP (mmHg): 120/90 136/92   RHR (bpm): 60 62    Strength R (lbs)t: 127.6667 111.82937    Strength Lt (lbs): 120 106.21428   Push-up Assessment: 50 56   Curl-up Assessment: 75 75   Flexibility Testing (cm): 16 17   REE (kcals): 1775 2670       Physical Exam    Assessment:       Resting BP: Within Normal Limits   Body Fat %: Fair   WHR Risk Factor: Low Risk    Strength R: Average    Strength L: Average   Upper Body Endurance: Excellent   Abdominal Endurance: Well Above Average   Lower body Flexibiltiy: Needs Improvement     Mr. Schwartz shows maintenance of most fitness levels, with the goals of increasing lower body flexibility and decreasing overall fat content.   1. Routine general medical examination at a health care facility        Plan:     Mr. Schwartz should strive to resume his regular exercise routine with the following recommendations:     - Cardiovascular: Perform 30-60min/day 5x/wk (>150min/wk) of moderate (112-137bpm) intensity exercise. More vigorous aerobics (>137bpm) can maintain or improve cardiovascular health with at least 75min/wk. Recommended to continue current aerobic exercises.     - Musculoskeletal: Perform 2-4 sets of 8-12 repetitions at moderate  intensity 2-4x/wk for each muscle group. Recommended to continue current resistance exercise routine.     - Flexibility: Perform 2-4 stretches for 30s for each muscle group daily for best results. Recommended to begin regular flexibility routine to increase range-of-motion.

## 2020-08-28 NOTE — PROGRESS NOTES
Nutrition Assessment  Client name:  Stephan Schwartz  :  1990  Age:  29 y.o.  Gender:  male    Client states he is here for his annual Executive Health medical examination. Mr. Schwartz works as a  for [employer] in [place], [place]. He is familiar to the nutrition benefit as part of his annual wellness exam. He shares he is not a fan of the commute as he lives in [place], which he mentions leads him to drink several tumblers of coffee per day. He also shares he has worked on being more physically active throughout the COVID times (2020 - Aug 2020) as his schedule is more fluid and has allowed him to work from home some days of the week. He enjoys HIIT and aims to be consistent with his routine. He has not changed his diet much since last year's recommendations. No significant PMH on file.    Anthropometrics  Height:  70 inches     Weight:  187.3 pounds  BMI:  26.9  % Body Fat:  23.40    Clinical Signs/Symptoms  N/V/D:  none  Appetite (Good, Fair, or Poor):  good      No past medical history on file.    No past surgical history on file.    Medications    currently has no medications in their medication list.    Vitamins, Minerals, and/or Supplements:  none     Food/Medication Interactions:  Reviewed     Food Allergies or Intolerances:  NKFA     Social History    Marital status:    Employment:  [employer]    Social History     Tobacco Use    Smoking status: Never Smoker    Smokeless tobacco: Never Used   Substance Use Topics    Alcohol use: Yes     Comment: Occasional        Lab Reports   Total Cholesterol:  220    Triglycerides:  61  HDL:  61  LDL:  146.8   Glucose:  102  HbA1c:  5.0  BP:  120/90     Food History  Breakfast:  Water + black coffee  Mid-morning Snack:  none  Lunch:  Leftovers from dinner / Italian (pizza/ pasta) Danish (sandwiches)  Mid-afternoon Snack:  None / almond chips + hummus   Dinner:  Homecooked: Paleo during the week (Beef/Chicken stir jha, bunless burgers w sweet  potato fries)  H.S. Snack:  Chocolate almond / ice cream on the weekends  *Fluid intake:  Wine (2 glasses w dinner), Flavored fizzy water, Coffee (black)     Exercise History:  5 days a week (HIIT at home), owns a dog     Cultural/Spiritual/Personal Preferences:  None identified    Support System:  Wife at home    State of Change:  preparation    Barriers to Change:  none    Diagnosis    Altered nutrition-related lab values related to inadequate intake of sat fat as evidenced by .8 .    Intervention    RMR (Method:  BodyScan):  1775 kcal  Activity Factor:  1.3  JASON:  2300 - 500 (wt loss) = 1800     Goals:  1.  Reduce intake of Saturated Fats (from foods) to no more than 6% of total calories (~12-15g) per day.  2.  Continue current physical activity routine (including HIIT 5x / week)      Nutrition Education  Discussed with patient the importance of being smart about the types of fats in the diet, especially if following a restrictive diet such as Paleo to maintain appropriate blood lipid levels. Provided examples of lean sources or protein and guidance for meal planning. Encouraged him to continue current physical activity routine. Patient verbalized understanding of nutrition education and recommendations received.    Handouts Provided  Meal Planning Guide      Monitoring/Evaluation    Monitor the following:  Weight  BMI  % Body Fat  Caloric intake  Labs:  TC, LDL, HDL    Follow Up Plan:  Communication with referring healthcare provider is unnecessary at this time as patient presented as part of annual wellness exam.  However, will follow up with patient in 1-2 years.

## 2020-09-03 ENCOUNTER — PATIENT OUTREACH (OUTPATIENT)
Dept: ADMINISTRATIVE | Facility: OTHER | Age: 30
End: 2020-09-03

## 2020-09-04 ENCOUNTER — OFFICE VISIT (OUTPATIENT)
Dept: OTOLARYNGOLOGY | Facility: CLINIC | Age: 30
End: 2020-09-04
Payer: COMMERCIAL

## 2020-09-04 DIAGNOSIS — H61.22 IMPACTED CERUMEN OF LEFT EAR: Primary | ICD-10-CM

## 2020-09-04 PROCEDURE — 99499 NO LOS: ICD-10-PCS | Mod: S$GLB,,, | Performed by: NURSE PRACTITIONER

## 2020-09-04 PROCEDURE — 69210 EAR CERUMEN REMOVAL: ICD-10-PCS | Mod: S$GLB,,, | Performed by: NURSE PRACTITIONER

## 2020-09-04 PROCEDURE — 69210 REMOVE IMPACTED EAR WAX UNI: CPT | Mod: S$GLB,,, | Performed by: NURSE PRACTITIONER

## 2020-09-04 PROCEDURE — 99499 UNLISTED E&M SERVICE: CPT | Mod: S$GLB,,, | Performed by: NURSE PRACTITIONER

## 2020-09-04 PROCEDURE — 99999 PR PBB SHADOW E&M-EST. PATIENT-LVL II: CPT | Mod: PBBFAC,,, | Performed by: NURSE PRACTITIONER

## 2020-09-04 PROCEDURE — 99999 PR PBB SHADOW E&M-EST. PATIENT-LVL II: ICD-10-PCS | Mod: PBBFAC,,, | Performed by: NURSE PRACTITIONER

## 2020-09-04 NOTE — PROCEDURES
Ear Cerumen Removal    Date/Time: 9/4/2020 8:00 AM  Performed by: Mira Hardwick NP  Authorized by: Mira Hardwick NP     Consent Done?:  Yes (Verbal)  Location details:  Left ear  Procedure type: curette    Cerumen  Removal Results:  Cerumen completely removed  Patient tolerance:  Patient tolerated the procedure well with no immediate complications     Procedure Note:    The patient was brought to the minor procedure room and placed under the operating microscope of the left ear canal which was cleaned of ceruminous debris. Using a combination of suction, curettes and cup forceps the patient's cerumen impaction was removed. The tympanic membrane was evaluated and was unremarkable. The patient tolerated the procedure well. There were no complications.

## 2020-09-04 NOTE — PROGRESS NOTES
LINKS immunization registry updated  Care Everywhere updated  Health Maintenance updated  Chart reviewed for overdue Proactive [facility] Encounters ([facility]) health maintenance testing (CRS, Breast Ca, Diabetic Eye Exam)   Orders entered:N/A

## 2020-09-04 NOTE — LETTER
September 4, 2020      Shirlye Rockwell MD  [address]  [facility] [address]           [facility] 4th Fl  [address]  [facility] [address]  Phone: [phone]  Fax: [phone]          Patient: Stephan Schwartz   MR Number: [id number]   YOB: 1990   Date of Visit: 9/4/2020       Dear Dr. Shirley Rockwell:    Thank you for referring Stephan Schwartz to me for evaluation. Attached you will find relevant portions of my assessment and plan of care.    If you have questions, please do not hesitate to call me. I look forward to following Stephan Schwartz along with you.    Sincerely,    Mira Hardwick, NP    Enclosure  CC:  No Recipients    If you would like to receive this communication electronically, please contact [email or web address] or [phone] to request more information on [employer] Link access.    For providers and/or their staff who would like to refer a patient to [facility], please contact us through our one-stop-shop provider referral line, [facility], at [phone].    If you feel you have received this communication in error or would no longer like to receive these types of communications, please e-mail [email or web address]

## 2020-10-06 ENCOUNTER — PATIENT OUTREACH (OUTPATIENT)
Dept: ADMINISTRATIVE | Facility: OTHER | Age: 30
End: 2020-10-06

## 2020-10-06 NOTE — PROGRESS NOTES
LINKS immunization registry not responding  Care Everywhere updated  Health Maintenance updated  Chart reviewed for overdue Proactive [facility] Encounters ([facility]) health maintenance testing (CRS, Breast Ca, Diabetic Eye Exam)   Orders entered:N/A

## 2020-10-07 ENCOUNTER — OFFICE VISIT (OUTPATIENT)
Dept: UROLOGY | Facility: CLINIC | Age: 30
End: 2020-10-07
Payer: COMMERCIAL

## 2020-10-07 VITALS
BODY MASS INDEX: 27.52 KG/M2 | DIASTOLIC BLOOD PRESSURE: 79 MMHG | HEIGHT: 70 IN | WEIGHT: 192.25 LBS | SYSTOLIC BLOOD PRESSURE: 134 MMHG | HEART RATE: 80 BPM

## 2020-10-07 DIAGNOSIS — E29.1 TESTICULAR FAILURE: Primary | ICD-10-CM

## 2020-10-07 PROCEDURE — 3008F PR BODY MASS INDEX (BMI) DOCUMENTED: ICD-10-PCS | Mod: CPTII,S$GLB,, | Performed by: UROLOGY

## 2020-10-07 PROCEDURE — 99999 PR PBB SHADOW E&M-EST. PATIENT-LVL III: ICD-10-PCS | Mod: PBBFAC,,, | Performed by: UROLOGY

## 2020-10-07 PROCEDURE — 99204 PR OFFICE/OUTPT VISIT, NEW, LEVL IV, 45-59 MIN: ICD-10-PCS | Mod: S$GLB,,, | Performed by: UROLOGY

## 2020-10-07 PROCEDURE — 99204 OFFICE O/P NEW MOD 45 MIN: CPT | Mod: S$GLB,,, | Performed by: UROLOGY

## 2020-10-07 PROCEDURE — 99999 PR PBB SHADOW E&M-EST. PATIENT-LVL III: CPT | Mod: PBBFAC,,, | Performed by: UROLOGY

## 2020-10-07 PROCEDURE — 3008F BODY MASS INDEX DOCD: CPT | Mod: CPTII,S$GLB,, | Performed by: UROLOGY

## 2020-10-07 NOTE — PROGRESS NOTES
"Chief Complaint:  Infertility    HPI:    Mr. Schwartz is a 29 y.o.  male who has been  to his wife for the past 1 years. They have been trying to achieve a pregnancy for the past 10 months but without success. Stephan Schwartz has not undergone a semen analysis. He denies a history of erectile dysfunction and ejaculatory problems.    He has achieved 0 pregnancies in the past.    Elodia Schwartz is 28 years old. ( [birth year]) Her menses are regular. She has not undergone prior hysterosalpingogram. She has achieved 0 prior pregnancies.  She sees Dr. Schneider    The couple has not undergone prior intrauterine insemination procedures.    The couple has not undergone prior in-vitro fertilization procedures.    Stephan Schwartz denies a history of exposure to harmful chemicals, toxins, and radiation.    No history of recent fevers greater than 101.5 degrees Farenheit.    No history of recent exposure to "wet heat."    No history of urological trauma or testicular torsion.    No history of prostatitis, epididymitis, and orchitis.    No history of post-pubertal mumps.    There is no known family history of fertility problems.    REVIEW OF SYSTEMS:     He denies headache, blurred vision, fever, nausea, vomiting, chills, abdominal pain, chest pain, sore throat, bleeding per rectum, cough, SOB, recent loss of consciousness, recent mental status changes, seizures, dizziness, or upper or lower extremity weakness.    PHYSICAL EXAM:     The patient generally appears in good health, is appropriately interactive, and is in no apparent distress.     Eyes: anicteric sclerae, moist conjunctivae; no lid-lag; PERRLA     HENT: Atraumatic; oropharynx clear with moist mucous membranes and no mucosal ulcerations;normal hard and soft palate.  No evidence of lymphadenopathy.    Neck: Trachea midline.  No thyromegaly.    Musculoskeletal: No abnormal gait.    Skin: No lesions.    Mental: Cooperative with normal affect.  Is oriented to time, " "place, and person.    Neuro: Grossly intact.    Chest: Normal inspiratory effort.   No accessory muscles.  No audible wheezes.  Respirations symmetric on inspiration and expiration.    Heart: Regular rhythm.      Abdomen:  Soft, non-tender. No masses or organomegaly. Bladder is not palpable. No evidence of flank discomfort. No evidence of inguinal hernia.    Genitourinary: Penis is normal with no evidence of plaques or induration. Urethral meatus is normal. Scrotum is normal. Testes are descended bilaterally with no evidence of abnormal masses or tenderness. Epididymis, vas deferens, and cord structures are normal bilaterally.  Testicular volume is approximately 18 cc bilaterally.    Extremities: No cyanosis, clubbing, or edema.    IMPRESSION & PLAN:    Mr. Schwartz is a 29 y.o.  male who has been  to his wife for the past 1 years. They have been trying to achieve a pregnancy for the past 10 months but without success. Stephan Schwartz has not undergone a semen analysis. He denies a history of erectile dysfunction and ejaculatory problems.    He has achieved 0 pregnancies in the past.  1.  FSH, LH, testosterone, prolactin, and estradiol serum levels today.  2.  Semen analysis x 2.  3.  Return to the clinic in 3 weeks to discuss test results and treatment plan.  4.  Recommend avoiding "wet heat."  5.  Recommend taking a multivitamin and 500 mg of vitamin c daily in addition to the multivitamin.  6.  Please send a copy of the note to Dr. Schneider.  Thank you for the consultation.    CC: Jennie      "

## 2020-10-07 NOTE — LETTER
October 7, 2020        Beatrice Schneider MD  [address]  [address]  [facility] [address]             [facility] - Urology Atrium 4th Fl  [address]  [place] [address]  Phone: [phone]   Patient: Stephan Schwartz   MR Number: [id number]   YOB: 1990   Date of Visit: 10/7/2020       Dear Dr. Schneider:    Thank you for referring Stephan Schwartz to me for evaluation. Attached you will find relevant portions of my assessment and plan of care.    If you have questions, please do not hesitate to call me. I look forward to following Stephan Schwartz along with you.    Sincerely,      Jose Bejarano MD            CC  No Recipients    Enclosure

## 2020-10-07 NOTE — LETTER
October 7, 2020      Beatrice Schneider MD  [address]  [address]  [facility] [address]           [facility] - Urology Atrium 4th Fl  [address]  [facility] [address]  Phone: [phone]          Patient: Stephan Schwartz   MR Number: [id number]   YOB: 1990   Date of Visit: 10/7/2020       Dear Dr. Beatrice Schneider:    Thank you for referring Stephan Schwartz to me for evaluation. Attached you will find relevant portions of my assessment and plan of care.    If you have questions, please do not hesitate to call me. I look forward to following Stephan Schwartz along with you.    Sincerely,    Jose Bejarano MD    Enclosure  CC:  No Recipients    If you would like to receive this communication electronically, please contact [email or web address] or [phone] to request more information on [employer] Link access.    For providers and/or their staff who would like to refer a patient to [facility], please contact us through our one-stop-shop provider referral line, [facility], at [phone].    If you feel you have received this communication in error or would no longer like to receive these types of communications, please e-mail [email or web address]

## 2020-10-19 ENCOUNTER — OFFICE VISIT (OUTPATIENT)
Dept: UROLOGY | Facility: CLINIC | Age: 30
End: 2020-10-19
Payer: COMMERCIAL

## 2020-10-19 VITALS
SYSTOLIC BLOOD PRESSURE: 149 MMHG | DIASTOLIC BLOOD PRESSURE: 84 MMHG | BODY MASS INDEX: 27.36 KG/M2 | WEIGHT: 191.13 LBS | HEIGHT: 70 IN | HEART RATE: 72 BPM

## 2020-10-19 DIAGNOSIS — E29.1 TESTICULAR FAILURE: Primary | ICD-10-CM

## 2020-10-19 PROCEDURE — 3008F PR BODY MASS INDEX (BMI) DOCUMENTED: ICD-10-PCS | Mod: CPTII,S$GLB,, | Performed by: UROLOGY

## 2020-10-19 PROCEDURE — 99999 PR PBB SHADOW E&M-EST. PATIENT-LVL III: ICD-10-PCS | Mod: PBBFAC,,, | Performed by: UROLOGY

## 2020-10-19 PROCEDURE — 99214 PR OFFICE/OUTPT VISIT, EST, LEVL IV, 30-39 MIN: ICD-10-PCS | Mod: S$GLB,,, | Performed by: UROLOGY

## 2020-10-19 PROCEDURE — 3008F BODY MASS INDEX DOCD: CPT | Mod: CPTII,S$GLB,, | Performed by: UROLOGY

## 2020-10-19 PROCEDURE — 99999 PR PBB SHADOW E&M-EST. PATIENT-LVL III: CPT | Mod: PBBFAC,,, | Performed by: UROLOGY

## 2020-10-19 PROCEDURE — 99214 OFFICE O/P EST MOD 30 MIN: CPT | Mod: S$GLB,,, | Performed by: UROLOGY

## 2020-10-19 NOTE — PROGRESS NOTES
"Chief Complaint:  Infertility    HPI:    Mr. Schwartz is a 29 y.o.  male who has been  to his wife for the past 1 years. They have been trying to achieve a pregnancy for the past 11 months but without success. 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 He denies a history of erectile dysfunction and ejaculatory problems.    Lab Results   Component Value Date    TOTALTESTOST 487 10/07/2020    LABLH 1.8 10/07/2020    FSH 2.10 10/07/2020    ESTRADIOL 21 10/07/2020    PROLACTIN 9.4 10/07/2020     SA 10/8/20--5.0 cc/40 million per cc/40%/4.5%  SA 10/2/20--3.0 cc/46.5 million per cc/60%/4.5%    FOR REVIEW FROM PREVIOUS:    Mr. Schwartz is a 29 y.o.  male who has been  to his wife for the past 1 years. They have been trying to achieve a pregnancy for the past 10 months but without success. Stephan Schwartz has not undergone a semen analysis. He denies a history of erectile dysfunction and ejaculatory problems.    He has achieved 0 pregnancies in the past.    Elodia Schwartz is 28 years old. ( [birth year]) Her menses are regular. She has not undergone prior hysterosalpingogram. She has achieved 0 prior pregnancies.  She sees Dr. Schneider    The couple has not undergone prior intrauterine insemination procedures.    The couple has not undergone prior in-vitro fertilization procedures.    Stephan Schwartz denies a history of exposure to harmful chemicals, toxins, and radiation.    No history of recent fevers greater than 101.5 degrees Farenheit.    No history of recent exposure to "wet heat."    No history of urological trauma or testicular torsion.    No history of prostatitis, epididymitis, and orchitis.    No history of post-pubertal mumps.    There is no known family history of fertility problems.    REVIEW OF SYSTEMS:     He denies headache, blurred vision, fever, nausea, vomiting, chills, abdominal pain, chest pain, sore throat, " bleeding per rectum, cough, SOB, recent loss of consciousness, recent mental status changes, seizures, dizziness, or upper or lower extremity weakness.    PHYSICAL EXAM:     The patient generally appears in good health, is appropriately interactive, and is in no apparent distress.     Eyes: anicteric sclerae, moist conjunctivae; no lid-lag; PERRLA     HENT: Atraumatic; oropharynx clear with moist mucous membranes and no mucosal ulcerations;normal hard and soft palate.  No evidence of lymphadenopathy.    Neck: Trachea midline.  No thyromegaly.    Musculoskeletal: No abnormal gait.    Skin: No lesions.    Mental: Cooperative with normal affect.  Is oriented to time, place, and person.    Neuro: Grossly intact.    Chest: Normal inspiratory effort.   No accessory muscles.  No audible wheezes.  Respirations symmetric on inspiration and expiration.    Heart: Regular rhythm.      Abdomen:  Soft, non-tender. No masses or organomegaly. Bladder is not palpable. No evidence of flank discomfort. No evidence of inguinal hernia.    Genitourinary: Penis is normal with no evidence of plaques or induration. Urethral meatus is normal. Scrotum is normal. Testes are descended bilaterally with no evidence of abnormal masses or tenderness. Epididymis, vas deferens, and cord structures are normal bilaterally.  Testicular volume is approximately 18 cc bilaterally.    Extremities: No cyanosis, clubbing, or edema.    IMPRESSION & PLAN:    Mr. Schwartz is a 29 y.o.  male who has been  to his wife for the past 1 years. They have been trying to achieve a pregnancy for the past 11 months but without success. 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 He denies a history of erectile dysfunction and ejaculatory problems.    Lab Results   Component Value Date    TOTALTESTOST 487 10/07/2020    LABLH 1.8 10/07/2020    FSH 2.10 10/07/2020    ESTRADIOL 21  "10/07/2020    PROLACTIN 9.4 10/07/2020     SA 10/8/20--5.0 cc/40 million per cc/40%/4.5%  SA 10/2/20--3.0 cc/46.5 million per cc/60%/4.5%    1.  Went over the results of his SA and hormonal panel today.  2.  From a male factor efforts with natural means, IUI, and IVF are all appropriate.  3.  I spent 25 minutes with the patient of which more than half was spent in direct consultation with the patient in regards to our treatment and plan.     4.  Recommend avoiding "wet heat."  5.  Recommend taking a multivitamin and 500 mg of vitamin c daily in addition to the multivitamin.  6.  Please send a copy of the note to Dr. Schneider.    7.  RTC 6 months if not pregnant by then.    CC: Jennie        "

## 2021-07-01 DIAGNOSIS — Z00.00 ROUTINE GENERAL MEDICAL EXAMINATION AT A HEALTH CARE FACILITY: Primary | ICD-10-CM

## 2021-07-20 ENCOUNTER — IMMUNIZATION (OUTPATIENT)
Dept: PHARMACY | Facility: CLINIC | Age: 31
End: 2021-07-20
Payer: COMMERCIAL

## 2021-07-20 DIAGNOSIS — Z23 NEED FOR VACCINATION: Primary | ICD-10-CM

## 2021-08-19 ENCOUNTER — CLINICAL SUPPORT (OUTPATIENT)
Dept: INTERNAL MEDICINE | Facility: CLINIC | Age: 31
End: 2021-08-19
Payer: COMMERCIAL

## 2021-08-19 ENCOUNTER — OFFICE VISIT (OUTPATIENT)
Dept: INTERNAL MEDICINE | Facility: CLINIC | Age: 31
End: 2021-08-19
Payer: COMMERCIAL

## 2021-08-19 ENCOUNTER — HOSPITAL ENCOUNTER (OUTPATIENT)
Dept: CARDIOLOGY | Facility: CLINIC | Age: 31
Discharge: HOME | End: 2021-08-19
Payer: COMMERCIAL

## 2021-08-19 VITALS
TEMPERATURE: 98 F | SYSTOLIC BLOOD PRESSURE: 116 MMHG | HEIGHT: 70 IN | WEIGHT: 193 LBS | BODY MASS INDEX: 27.63 KG/M2 | HEART RATE: 70 BPM | DIASTOLIC BLOOD PRESSURE: 74 MMHG

## 2021-08-19 DIAGNOSIS — Z00.00 ANNUAL PHYSICAL EXAM: Primary | ICD-10-CM

## 2021-08-19 DIAGNOSIS — Z00.00 ROUTINE GENERAL MEDICAL EXAMINATION AT A HEALTH CARE FACILITY: Primary | ICD-10-CM

## 2021-08-19 DIAGNOSIS — Z00.00 ROUTINE GENERAL MEDICAL EXAMINATION AT A HEALTH CARE FACILITY: ICD-10-CM

## 2021-08-19 DIAGNOSIS — E78.5 HYPERLIPIDEMIA LDL GOAL <130: ICD-10-CM

## 2021-08-19 LAB
ALBUMIN SERPL BCP-MCNC: 4 G/DL (ref 3.5–5.2)
ALP SERPL-CCNC: 85 U/L (ref 55–135)
ALT SERPL W/O P-5'-P-CCNC: 33 U/L (ref 10–44)
ANION GAP SERPL CALC-SCNC: 11 MMOL/L (ref 8–16)
AST SERPL-CCNC: 27 U/L (ref 10–40)
BILIRUB SERPL-MCNC: 0.5 MG/DL (ref 0.1–1)
BUN SERPL-MCNC: 14 MG/DL (ref 6–20)
CALCIUM SERPL-MCNC: 9.8 MG/DL (ref 8.7–10.5)
CHLORIDE SERPL-SCNC: 103 MMOL/L (ref 95–110)
CHOLEST SERPL-MCNC: 226 MG/DL (ref 120–199)
CHOLEST/HDLC SERPL: 3.8 {RATIO} (ref 2–5)
CO2 SERPL-SCNC: 28 MMOL/L (ref 23–29)
CREAT SERPL-MCNC: 1 MG/DL (ref 0.5–1.4)
ERYTHROCYTE [DISTWIDTH] IN BLOOD BY AUTOMATED COUNT: 12 % (ref 11.5–14.5)
EST. GFR  (AFRICAN AMERICAN): >60 ML/MIN/1.73 M^2
EST. GFR  (NON AFRICAN AMERICAN): >60 ML/MIN/1.73 M^2
ESTIMATED AVG GLUCOSE: 94 MG/DL (ref 68–131)
GLUCOSE SERPL-MCNC: 101 MG/DL (ref 70–110)
HBA1C MFR BLD: 4.9 % (ref 4–5.6)
HCT VFR BLD AUTO: 44.4 % (ref 40–54)
HCV AB SERPL QL IA: NEGATIVE
HDLC SERPL-MCNC: 59 MG/DL (ref 40–75)
HDLC SERPL: 26.1 % (ref 20–50)
HGB BLD-MCNC: 15.1 G/DL (ref 14–18)
LDLC SERPL CALC-MCNC: 146.6 MG/DL (ref 63–159)
MCH RBC QN AUTO: 30.6 PG (ref 27–31)
MCHC RBC AUTO-ENTMCNC: 34 G/DL (ref 32–36)
MCV RBC AUTO: 90 FL (ref 82–98)
NONHDLC SERPL-MCNC: 167 MG/DL
PLATELET # BLD AUTO: 203 K/UL (ref 150–450)
PMV BLD AUTO: 11.4 FL (ref 9.2–12.9)
POTASSIUM SERPL-SCNC: 4.4 MMOL/L (ref 3.5–5.1)
PROT SERPL-MCNC: 7.1 G/DL (ref 6–8.4)
RBC # BLD AUTO: 4.93 M/UL (ref 4.6–6.2)
SODIUM SERPL-SCNC: 142 MMOL/L (ref 136–145)
TRIGL SERPL-MCNC: 102 MG/DL (ref 30–150)
TSH SERPL DL<=0.005 MIU/L-ACNC: 2.26 UIU/ML (ref 0.4–4)
WBC # BLD AUTO: 6.77 K/UL (ref 3.9–12.7)

## 2021-08-19 PROCEDURE — 97750 PHYSICAL PERFORMANCE TEST: CPT | Mod: S$GLB,,, | Performed by: INTERNAL MEDICINE

## 2021-08-19 PROCEDURE — 97750 PR PHYSICAL PERFORMANCE TEST: ICD-10-PCS | Mod: S$GLB,,, | Performed by: INTERNAL MEDICINE

## 2021-08-19 PROCEDURE — 3044F PR MOST RECENT HEMOGLOBIN A1C LEVEL <7.0%: ICD-10-PCS | Mod: CPTII,S$GLB,, | Performed by: INTERNAL MEDICINE

## 2021-08-19 PROCEDURE — 80061 LIPID PANEL: CPT | Performed by: INTERNAL MEDICINE

## 2021-08-19 PROCEDURE — 99999 PR PBB SHADOW E&M-EST. PATIENT-LVL II: CPT | Mod: PBBFAC,,, | Performed by: INTERNAL MEDICINE

## 2021-08-19 PROCEDURE — 99395 PR PREVENTIVE VISIT,EST,18-39: ICD-10-PCS | Mod: S$GLB,,, | Performed by: INTERNAL MEDICINE

## 2021-08-19 PROCEDURE — 3044F HG A1C LEVEL LT 7.0%: CPT | Mod: CPTII,S$GLB,, | Performed by: INTERNAL MEDICINE

## 2021-08-19 PROCEDURE — 97802 PR MED NUTR THER, 1ST, INDIV, EA 15 MIN: ICD-10-PCS | Mod: S$GLB,,, | Performed by: INTERNAL MEDICINE

## 2021-08-19 PROCEDURE — 93010 EKG 12-LEAD: ICD-10-PCS | Mod: S$GLB,,, | Performed by: INTERNAL MEDICINE

## 2021-08-19 PROCEDURE — 3078F PR MOST RECENT DIASTOLIC BLOOD PRESSURE < 80 MM HG: ICD-10-PCS | Mod: CPTII,S$GLB,, | Performed by: INTERNAL MEDICINE

## 2021-08-19 PROCEDURE — 3078F DIAST BP <80 MM HG: CPT | Mod: CPTII,S$GLB,, | Performed by: INTERNAL MEDICINE

## 2021-08-19 PROCEDURE — 93010 ELECTROCARDIOGRAM REPORT: CPT | Mod: S$GLB,,, | Performed by: INTERNAL MEDICINE

## 2021-08-19 PROCEDURE — 3008F PR BODY MASS INDEX (BMI) DOCUMENTED: ICD-10-PCS | Mod: CPTII,S$GLB,, | Performed by: INTERNAL MEDICINE

## 2021-08-19 PROCEDURE — 93005 ELECTROCARDIOGRAM TRACING: CPT | Mod: S$GLB,,, | Performed by: INTERNAL MEDICINE

## 2021-08-19 PROCEDURE — 36415 COLL VENOUS BLD VENIPUNCTURE: CPT | Performed by: INTERNAL MEDICINE

## 2021-08-19 PROCEDURE — 3074F PR MOST RECENT SYSTOLIC BLOOD PRESSURE < 130 MM HG: ICD-10-PCS | Mod: CPTII,S$GLB,, | Performed by: INTERNAL MEDICINE

## 2021-08-19 PROCEDURE — 97802 MEDICAL NUTRITION INDIV IN: CPT | Mod: S$GLB,,, | Performed by: INTERNAL MEDICINE

## 2021-08-19 PROCEDURE — 3074F SYST BP LT 130 MM HG: CPT | Mod: CPTII,S$GLB,, | Performed by: INTERNAL MEDICINE

## 2021-08-19 PROCEDURE — 99395 PREV VISIT EST AGE 18-39: CPT | Mod: S$GLB,,, | Performed by: INTERNAL MEDICINE

## 2021-08-19 PROCEDURE — 99999 PR PBB SHADOW E&M-EST. PATIENT-LVL II: ICD-10-PCS | Mod: PBBFAC,,, | Performed by: INTERNAL MEDICINE

## 2021-08-19 PROCEDURE — 1126F AMNT PAIN NOTED NONE PRSNT: CPT | Mod: CPTII,S$GLB,, | Performed by: INTERNAL MEDICINE

## 2021-08-19 PROCEDURE — 84443 ASSAY THYROID STIM HORMONE: CPT | Performed by: INTERNAL MEDICINE

## 2021-08-19 PROCEDURE — 93005 EKG 12-LEAD: ICD-10-PCS | Mod: S$GLB,,, | Performed by: INTERNAL MEDICINE

## 2021-08-19 PROCEDURE — 1126F PR PAIN SEVERITY QUANTIFIED, NO PAIN PRESENT: ICD-10-PCS | Mod: CPTII,S$GLB,, | Performed by: INTERNAL MEDICINE

## 2021-08-19 PROCEDURE — 85027 COMPLETE CBC AUTOMATED: CPT | Performed by: INTERNAL MEDICINE

## 2021-08-19 PROCEDURE — 3008F BODY MASS INDEX DOCD: CPT | Mod: CPTII,S$GLB,, | Performed by: INTERNAL MEDICINE

## 2021-08-19 PROCEDURE — 80053 COMPREHEN METABOLIC PANEL: CPT | Performed by: INTERNAL MEDICINE

## 2021-08-19 PROCEDURE — 83036 HEMOGLOBIN GLYCOSYLATED A1C: CPT | Performed by: INTERNAL MEDICINE

## 2021-08-19 PROCEDURE — 86803 HEPATITIS C AB TEST: CPT | Performed by: INTERNAL MEDICINE

## 2022-01-04 ENCOUNTER — OFFICE VISIT (OUTPATIENT)
Dept: OTOLARYNGOLOGY | Facility: CLINIC | Age: 32
End: 2022-01-04
Payer: COMMERCIAL

## 2022-01-04 DIAGNOSIS — H61.21 EXCESSIVE CERUMEN IN RIGHT EAR CANAL: Primary | ICD-10-CM

## 2022-01-04 PROCEDURE — 1159F MED LIST DOCD IN RCRD: CPT | Mod: CPTII,S$GLB,, | Performed by: NURSE PRACTITIONER

## 2022-01-04 PROCEDURE — 69210 EAR CERUMEN REMOVAL: ICD-10-PCS | Mod: S$GLB,,, | Performed by: NURSE PRACTITIONER

## 2022-01-04 PROCEDURE — 1159F PR MEDICATION LIST DOCUMENTED IN MEDICAL RECORD: ICD-10-PCS | Mod: CPTII,S$GLB,, | Performed by: NURSE PRACTITIONER

## 2022-01-04 PROCEDURE — 69210 REMOVE IMPACTED EAR WAX UNI: CPT | Mod: S$GLB,,, | Performed by: NURSE PRACTITIONER

## 2022-01-04 PROCEDURE — 99499 UNLISTED E&M SERVICE: CPT | Mod: S$GLB,,, | Performed by: NURSE PRACTITIONER

## 2022-01-04 PROCEDURE — 99499 NO LOS: ICD-10-PCS | Mod: S$GLB,,, | Performed by: NURSE PRACTITIONER

## 2022-01-04 PROCEDURE — 99999 PR PBB SHADOW E&M-EST. PATIENT-LVL II: ICD-10-PCS | Mod: PBBFAC,,, | Performed by: NURSE PRACTITIONER

## 2022-01-04 PROCEDURE — 1160F RVW MEDS BY RX/DR IN RCRD: CPT | Mod: CPTII,S$GLB,, | Performed by: NURSE PRACTITIONER

## 2022-01-04 PROCEDURE — 1160F PR REVIEW ALL MEDS BY PRESCRIBER/CLIN PHARMACIST DOCUMENTED: ICD-10-PCS | Mod: CPTII,S$GLB,, | Performed by: NURSE PRACTITIONER

## 2022-01-04 PROCEDURE — 99999 PR PBB SHADOW E&M-EST. PATIENT-LVL II: CPT | Mod: PBBFAC,,, | Performed by: NURSE PRACTITIONER

## 2022-01-04 NOTE — PROCEDURES
Ear Cerumen Removal    Date/Time: 1/4/2022 3:30 PM  Performed by: Mira Hardwick DNP, FNP-C  Authorized by: Mira Hardwick DNP, FNP-C     Consent Done?:  Yes (Verbal)    Local anesthetic:  None  Location details:  Right ear  Procedure type: curette    Cerumen  Removal Results:  Cerumen completely removed  Patient tolerance:  Patient tolerated the procedure well with no immediate complications     Procedure Note:    Patient was brought to the minor procedure room and using the operating microscope of the right ear canal which was cleaned of ceruminous debris. There was a significant cerumen impaction.  Using a combination of suction, curettes and cup forceps the patient's cerumen impaction was removed. Tympanic membrane intact. Pt tolerated well. There were no complications.

## 2022-06-07 DIAGNOSIS — Z00.00 ROUTINE GENERAL MEDICAL EXAMINATION AT A HEALTH CARE FACILITY: Primary | ICD-10-CM

## 2022-06-27 ENCOUNTER — ANESTHESIA EVENT (OUTPATIENT)
Dept: SURGERY | Facility: HOSPITAL | Age: 32
End: 2022-06-27
Payer: COMMERCIAL

## 2022-06-27 ENCOUNTER — OFFICE VISIT (OUTPATIENT)
Dept: ORTHOPEDICS | Facility: CLINIC | Age: 32
End: 2022-06-27
Payer: COMMERCIAL

## 2022-06-27 ENCOUNTER — HOSPITAL ENCOUNTER (OUTPATIENT)
Dept: RADIOLOGY | Facility: HOSPITAL | Age: 32
Discharge: HOME | End: 2022-06-27
Attending: PHYSICIAN ASSISTANT
Payer: COMMERCIAL

## 2022-06-27 ENCOUNTER — TELEPHONE (OUTPATIENT)
Dept: ORTHOPEDICS | Facility: CLINIC | Age: 32
End: 2022-06-27
Payer: COMMERCIAL

## 2022-06-27 VITALS — BODY MASS INDEX: 27.61 KG/M2 | WEIGHT: 192.88 LBS | HEIGHT: 70 IN

## 2022-06-27 DIAGNOSIS — S86.012A ACHILLES RUPTURE, LEFT: ICD-10-CM

## 2022-06-27 DIAGNOSIS — Z01.818 PRE-OP EVALUATION: Primary | ICD-10-CM

## 2022-06-27 DIAGNOSIS — S86.012A RUPTURE OF LEFT ACHILLES TENDON, INITIAL ENCOUNTER: ICD-10-CM

## 2022-06-27 DIAGNOSIS — M25.572 LEFT ANKLE PAIN: ICD-10-CM

## 2022-06-27 DIAGNOSIS — S86.012A ACHILLES RUPTURE, LEFT: Primary | ICD-10-CM

## 2022-06-27 DIAGNOSIS — S86.012D RUPTURE OF LEFT ACHILLES TENDON, SUBSEQUENT ENCOUNTER: Primary | ICD-10-CM

## 2022-06-27 DIAGNOSIS — M25.572 LEFT ANKLE PAIN: Primary | ICD-10-CM

## 2022-06-27 DIAGNOSIS — G89.18 POST-OP PAIN: ICD-10-CM

## 2022-06-27 PROCEDURE — 99999 PR PBB SHADOW E&M-EST. PATIENT-LVL III: ICD-10-PCS | Mod: PBBFAC,,, | Performed by: PHYSICIAN ASSISTANT

## 2022-06-27 PROCEDURE — 73610 X-RAY EXAM OF ANKLE: CPT | Mod: 26,LT,, | Performed by: RADIOLOGY

## 2022-06-27 PROCEDURE — 99499 UNLISTED E&M SERVICE: CPT | Mod: S$GLB,,, | Performed by: PHYSICIAN ASSISTANT

## 2022-06-27 PROCEDURE — 1160F RVW MEDS BY RX/DR IN RCRD: CPT | Mod: CPTII,S$GLB,, | Performed by: PHYSICIAN ASSISTANT

## 2022-06-27 PROCEDURE — 99999 PR PBB SHADOW E&M-EST. PATIENT-LVL III: CPT | Mod: PBBFAC,,, | Performed by: PHYSICIAN ASSISTANT

## 2022-06-27 PROCEDURE — 73610 X-RAY EXAM OF ANKLE: CPT | Mod: TC,LT

## 2022-06-27 PROCEDURE — 1159F PR MEDICATION LIST DOCUMENTED IN MEDICAL RECORD: ICD-10-PCS | Mod: CPTII,S$GLB,, | Performed by: PHYSICIAN ASSISTANT

## 2022-06-27 PROCEDURE — 1160F PR REVIEW ALL MEDS BY PRESCRIBER/CLIN PHARMACIST DOCUMENTED: ICD-10-PCS | Mod: CPTII,S$GLB,, | Performed by: PHYSICIAN ASSISTANT

## 2022-06-27 PROCEDURE — 1159F MED LIST DOCD IN RCRD: CPT | Mod: CPTII,S$GLB,, | Performed by: PHYSICIAN ASSISTANT

## 2022-06-27 PROCEDURE — 73610 XR ANKLE COMPLETE 3 VIEW LEFT: ICD-10-PCS | Mod: 26,LT,, | Performed by: RADIOLOGY

## 2022-06-27 PROCEDURE — 99499 NO LOS: ICD-10-PCS | Mod: S$GLB,,, | Performed by: PHYSICIAN ASSISTANT

## 2022-06-27 PROCEDURE — 3008F PR BODY MASS INDEX (BMI) DOCUMENTED: ICD-10-PCS | Mod: CPTII,S$GLB,, | Performed by: PHYSICIAN ASSISTANT

## 2022-06-27 PROCEDURE — 3008F BODY MASS INDEX DOCD: CPT | Mod: CPTII,S$GLB,, | Performed by: PHYSICIAN ASSISTANT

## 2022-06-27 RX ORDER — ASPIRIN 325 MG
325 TABLET ORAL DAILY
Qty: 30 TABLET | Refills: 0 | Status: SHIPPED | OUTPATIENT
Start: 2022-06-27 | End: 2022-07-27

## 2022-06-27 RX ORDER — ACETAMINOPHEN 500 MG
1000 TABLET ORAL EVERY 8 HOURS
Qty: 90 TABLET | Refills: 0 | Status: SHIPPED | OUTPATIENT
Start: 2022-06-27 | End: 2022-07-27

## 2022-06-27 RX ORDER — NAPROXEN 500 MG/1
500 TABLET ORAL 2 TIMES DAILY WITH MEALS
Qty: 60 TABLET | Refills: 0 | Status: SHIPPED | OUTPATIENT
Start: 2022-06-27 | End: 2022-07-15 | Stop reason: ALTCHOICE

## 2022-06-27 RX ORDER — MUPIROCIN 20 MG/G
OINTMENT TOPICAL
Status: CANCELLED | OUTPATIENT
Start: 2022-06-27

## 2022-06-27 RX ORDER — GABAPENTIN 300 MG/1
300 CAPSULE ORAL 3 TIMES DAILY PRN
Qty: 30 CAPSULE | Refills: 0 | Status: SHIPPED | OUTPATIENT
Start: 2022-06-27 | End: 2022-07-15 | Stop reason: ALTCHOICE

## 2022-06-27 RX ORDER — METHOCARBAMOL 750 MG/1
750 TABLET, FILM COATED ORAL 3 TIMES DAILY PRN
Qty: 30 TABLET | Refills: 0 | Status: SHIPPED | OUTPATIENT
Start: 2022-06-27 | End: 2022-07-08

## 2022-06-27 RX ORDER — OXYCODONE HYDROCHLORIDE 5 MG/1
5 TABLET ORAL EVERY 4 HOURS PRN
Qty: 30 TABLET | Refills: 0 | Status: SHIPPED | OUTPATIENT
Start: 2022-06-27 | End: 2022-07-15 | Stop reason: ALTCHOICE

## 2022-06-27 NOTE — H&P (VIEW-ONLY)
"Subjective:      Patient ID: Stephan Schwartz is a 31 y.o. male.    Chief Complaint: Pre-op Exam  31 year old male presents with chief complaint of left ankle pain since yesterday 6/26/22. He was playing tennis and took a step and felt a pop at the back of the ankle and fell. Pain has been mild but he reports soreness at the calf. He cannot stand on his tippy toes on the left side. He has been taking ibuprofen and wearing an otc ankle brace. He reports mild N/T at the toes. He denies h/o achilles pain. He is active and runs. He works in [employer].     Past Medical History:   Diagnosis Date    Hyperlipidemia     Multiple nevi      History reviewed. No pertinent surgical history.  Social History     Occupational History    Occupation: finance   Tobacco Use    Smoking status: Never Smoker    Smokeless tobacco: Never Used   Substance and Sexual Activity    Alcohol use: Yes     Comment: Occasional    Drug use: Never    Sexual activity: Yes     Partners: Female      Review of Systems   Constitutional: Negative for chills, fever and night sweats.   Cardiovascular: Negative for chest pain.   Respiratory: Negative for cough and shortness of breath.    Hematologic/Lymphatic: Does not bruise/bleed easily.   Skin: Negative for color change.   Gastrointestinal: Negative for heartburn.   Genitourinary: Negative for dysuria.   Psychiatric/Behavioral: Negative for altered mental status.   Allergic/Immunologic: Negative for persistent infections.     No current outpatient medications on file prior to visit.     No current facility-administered medications on file prior to visit.     Review of patient's allergies indicates:  No Known Allergies      Objective:      Vitals:    06/27/22 1311   Weight: 87.5 kg (192 lb 14.4 oz)   Height: 5' 10" (1.778 m)     Gen: Well developed. No acute distress.  Cardio: RR  Pulm: Breathing on room air  Skin: intact with no lesions  Psych: Normal mood and affect  Left Ankle Exam   Swelling: " mild    Other   Erythema: absent  Sensation: normal  Pulse: present    Comments:  TTP achilles. Abnormal Martino test. ROM intact but limited.                Assessment:       1. Pre-op evaluation    2. Rupture of left Achilles tendon, initial encounter    3. Post-op pain          Plan:       Surgery per Dr. Walker.

## 2022-06-27 NOTE — PROGRESS NOTES
Stephan is a 31 y.o. male here today for a pre-operative visit in preparation for a Left Achilles repair to be performed by Dr. Walker on 6-28-22.  he was last seen and treated in the clinic on 6-27-22.  he has since seen their primary care for optimization of the chronic health concerns.  There has been no significant change in their past medical or orthopedic status since the previous visit.  No fever, chills, malaise or unexplained weight change.     Allergies, medications, past medical history and past surgical history were reviewed with the patient.     Physical examination was performed.     Consents were signed.   Patient has crutches, scooter and other iWalk and will bring with them the day of surgery. They have been counseled on proper use of the assistive device.     Discussed COVID19 with the patient, they are aware of our current policies and procedures, were given the option of delaying surgery, and they elect to proceed. Covid testing to be done 48 hours prior to surgery.    Pre, pam, and post operative procedure and expectations were discussed.  Questions were answered. The patient has been educated and is ready to proceed with surgery.  Approximately 30 minutes was spent discussing surgical outcomes, plans, procedures, pre, pam, and post operative expectations and care. The risks, benefits and alternatives to surgery were discussed with the patient at great length.  These include bleeding, infection, vessel/nerve damage, pain, numbness, tingling, complex regional pain syndrome, hardware/surgical failure, need for further surgery, malunion, nonunion, DVT, PE, arthritis and death. He also understands that the risks of surgery may be greater for some patients due to health or lifestyle issues, such as a current condition or a history of heart disease, obesity, clotting disorders, recurrent infections, smoking, sedentary lifestyle, or noncompliance with medications, therapy, or followup. The degree of  the increased risk is hard to estimate with any degree of precision.  Patient states an understanding and wishes to proceed with surgery.   All questions were answered.  No guarantees were implied or stated.  Informed consent was obtained  The patient will contact us if the have any questions, concerns, and changes in their medical condition prior to surgery.

## 2022-06-27 NOTE — H&P
"Subjective:      Patient ID: Stephan Schwartz is a 31 y.o. male.    Chief Complaint: Pre-op Exam  31 year old male presents with chief complaint of left ankle pain since yesterday 6/26/22. He was playing tennis and took a step and felt a pop at the back of the ankle and fell. Pain has been mild but he reports soreness at the calf. He cannot stand on his tippy toes on the left side. He has been taking ibuprofen and wearing an otc ankle brace. He reports mild N/T at the toes. He denies h/o achilles pain. He is active and runs. He works in [employer].     Past Medical History:   Diagnosis Date    Hyperlipidemia     Multiple nevi      History reviewed. No pertinent surgical history.  Social History     Occupational History    Occupation: finance   Tobacco Use    Smoking status: Never Smoker    Smokeless tobacco: Never Used   Substance and Sexual Activity    Alcohol use: Yes     Comment: Occasional    Drug use: Never    Sexual activity: Yes     Partners: Female      Review of Systems   Constitutional: Negative for chills, fever and night sweats.   Cardiovascular: Negative for chest pain.   Respiratory: Negative for cough and shortness of breath.    Hematologic/Lymphatic: Does not bruise/bleed easily.   Skin: Negative for color change.   Gastrointestinal: Negative for heartburn.   Genitourinary: Negative for dysuria.   Psychiatric/Behavioral: Negative for altered mental status.   Allergic/Immunologic: Negative for persistent infections.     No current outpatient medications on file prior to visit.     No current facility-administered medications on file prior to visit.     Review of patient's allergies indicates:  No Known Allergies      Objective:      Vitals:    06/27/22 1311   Weight: 87.5 kg (192 lb 14.4 oz)   Height: 5' 10" (1.778 m)     Gen: Well developed. No acute distress.  Cardio: RR  Pulm: Breathing on room air  Skin: intact with no lesions  Psych: Normal mood and affect  Left Ankle Exam   Swelling: " mild    Other   Erythema: absent  Sensation: normal  Pulse: present    Comments:  TTP achilles. Abnormal Martino test. ROM intact but limited.                Assessment:       1. Pre-op evaluation    2. Rupture of left Achilles tendon, initial encounter    3. Post-op pain          Plan:       Surgery per Dr. Walker.

## 2022-06-28 ENCOUNTER — ANESTHESIA (OUTPATIENT)
Dept: SURGERY | Facility: HOSPITAL | Age: 32
End: 2022-06-28
Payer: COMMERCIAL

## 2022-06-28 ENCOUNTER — HOSPITAL ENCOUNTER (OUTPATIENT)
Facility: HOSPITAL | Age: 32
Discharge: HOME | End: 2022-06-28
Attending: ORTHOPAEDIC SURGERY | Admitting: ORTHOPAEDIC SURGERY
Payer: COMMERCIAL

## 2022-06-28 VITALS
HEART RATE: 57 BPM | SYSTOLIC BLOOD PRESSURE: 119 MMHG | RESPIRATION RATE: 17 BRPM | OXYGEN SATURATION: 97 % | TEMPERATURE: 98 F | BODY MASS INDEX: 27.49 KG/M2 | WEIGHT: 192 LBS | HEIGHT: 70 IN | DIASTOLIC BLOOD PRESSURE: 65 MMHG

## 2022-06-28 DIAGNOSIS — S86.012A RUPTURE OF LEFT ACHILLES TENDON, INITIAL ENCOUNTER: ICD-10-CM

## 2022-06-28 DIAGNOSIS — S86.012D RUPTURE OF LEFT ACHILLES TENDON, SUBSEQUENT ENCOUNTER: ICD-10-CM

## 2022-06-28 DIAGNOSIS — S86.012A ACHILLES RUPTURE, LEFT: Primary | ICD-10-CM

## 2022-06-28 PROCEDURE — 63600175 PHARM REV CODE 636 W HCPCS: Performed by: NURSE ANESTHETIST, CERTIFIED REGISTERED

## 2022-06-28 PROCEDURE — 27650 REPAIR ACHILLES TENDON: CPT | Mod: LT,,, | Performed by: ORTHOPAEDIC SURGERY

## 2022-06-28 PROCEDURE — 25000003 PHARM REV CODE 250: Performed by: ORTHOPAEDIC SURGERY

## 2022-06-28 PROCEDURE — 27201423 OPTIME MED/SURG SUP & DEVICES STERILE SUPPLY: Performed by: ORTHOPAEDIC SURGERY

## 2022-06-28 PROCEDURE — 37000008 HC ANESTHESIA 1ST 15 MINUTES: Performed by: ORTHOPAEDIC SURGERY

## 2022-06-28 PROCEDURE — 25000003 PHARM REV CODE 250: Performed by: ANESTHESIOLOGY

## 2022-06-28 PROCEDURE — D9220A PRA ANESTHESIA: Mod: ANES,,, | Performed by: ANESTHESIOLOGY

## 2022-06-28 PROCEDURE — 36000708 HC OR TIME LEV III 1ST 15 MIN: Performed by: ORTHOPAEDIC SURGERY

## 2022-06-28 PROCEDURE — 94761 N-INVAS EAR/PLS OXIMETRY MLT: CPT

## 2022-06-28 PROCEDURE — 36000709 HC OR TIME LEV III EA ADD 15 MIN: Performed by: ORTHOPAEDIC SURGERY

## 2022-06-28 PROCEDURE — 25000003 PHARM REV CODE 250: Performed by: STUDENT IN AN ORGANIZED HEALTH CARE EDUCATION/TRAINING PROGRAM

## 2022-06-28 PROCEDURE — D9220A PRA ANESTHESIA: Mod: CRNA,,, | Performed by: NURSE ANESTHETIST, CERTIFIED REGISTERED

## 2022-06-28 PROCEDURE — C1713 ANCHOR/SCREW BN/BN,TIS/BN: HCPCS | Performed by: ORTHOPAEDIC SURGERY

## 2022-06-28 PROCEDURE — 25000003 PHARM REV CODE 250: Performed by: NURSE ANESTHETIST, CERTIFIED REGISTERED

## 2022-06-28 PROCEDURE — 71000033 HC RECOVERY, INTIAL HOUR: Performed by: ORTHOPAEDIC SURGERY

## 2022-06-28 PROCEDURE — 37000009 HC ANESTHESIA EA ADD 15 MINS: Performed by: ORTHOPAEDIC SURGERY

## 2022-06-28 PROCEDURE — 63600175 PHARM REV CODE 636 W HCPCS: Performed by: ANESTHESIOLOGY

## 2022-06-28 PROCEDURE — D9220A PRA ANESTHESIA: ICD-10-PCS | Mod: CRNA,,, | Performed by: NURSE ANESTHETIST, CERTIFIED REGISTERED

## 2022-06-28 PROCEDURE — 27650 PR REPAIR ACHILLES TENDON,PRIMARY: ICD-10-PCS | Mod: LT,,, | Performed by: ORTHOPAEDIC SURGERY

## 2022-06-28 PROCEDURE — 71000015 HC POSTOP RECOV 1ST HR: Performed by: ORTHOPAEDIC SURGERY

## 2022-06-28 PROCEDURE — 99900035 HC TECH TIME PER 15 MIN (STAT)

## 2022-06-28 PROCEDURE — D9220A PRA ANESTHESIA: ICD-10-PCS | Mod: ANES,,, | Performed by: ANESTHESIOLOGY

## 2022-06-28 DEVICE — KIT SUTURETAPE PARS: Type: IMPLANTABLE DEVICE | Site: ACHILLES TENDON | Status: FUNCTIONAL

## 2022-06-28 RX ORDER — DEXAMETHASONE SODIUM PHOSPHATE 4 MG/ML
INJECTION, SOLUTION INTRA-ARTICULAR; INTRALESIONAL; INTRAMUSCULAR; INTRAVENOUS; SOFT TISSUE
Status: DISCONTINUED | OUTPATIENT
Start: 2022-06-28 | End: 2022-06-28

## 2022-06-28 RX ORDER — HYDROMORPHONE HYDROCHLORIDE 1 MG/ML
0.2 INJECTION, SOLUTION INTRAMUSCULAR; INTRAVENOUS; SUBCUTANEOUS EVERY 5 MIN PRN
Status: DISCONTINUED | OUTPATIENT
Start: 2022-06-28 | End: 2022-06-28 | Stop reason: HOSPADM

## 2022-06-28 RX ORDER — CELECOXIB 200 MG/1
400 CAPSULE ORAL ONCE
Status: COMPLETED | OUTPATIENT
Start: 2022-06-28 | End: 2022-06-28

## 2022-06-28 RX ORDER — ACETAMINOPHEN 500 MG
1000 TABLET ORAL ONCE
Status: COMPLETED | OUTPATIENT
Start: 2022-06-28 | End: 2022-06-28

## 2022-06-28 RX ORDER — LIDOCAINE HYDROCHLORIDE 10 MG/ML
INJECTION INFILTRATION; PERINEURAL
Status: DISCONTINUED | OUTPATIENT
Start: 2022-06-28 | End: 2022-06-28 | Stop reason: HOSPADM

## 2022-06-28 RX ORDER — HYDROCODONE BITARTRATE AND ACETAMINOPHEN 5; 325 MG/1; MG/1
1 TABLET ORAL EVERY 4 HOURS PRN
Status: DISCONTINUED | OUTPATIENT
Start: 2022-06-28 | End: 2022-06-28 | Stop reason: HOSPADM

## 2022-06-28 RX ORDER — NEOSTIGMINE METHYLSULFATE 1 MG/ML
INJECTION, SOLUTION INTRAVENOUS
Status: DISCONTINUED | OUTPATIENT
Start: 2022-06-28 | End: 2022-06-28

## 2022-06-28 RX ORDER — OXYCODONE HYDROCHLORIDE 5 MG/1
5 TABLET ORAL
Status: DISCONTINUED | OUTPATIENT
Start: 2022-06-28 | End: 2022-06-28 | Stop reason: HOSPADM

## 2022-06-28 RX ORDER — CARBOXYMETHYLCELLULOSE SODIUM 5 MG/ML
SOLUTION/ DROPS OPHTHALMIC
Status: DISCONTINUED | OUTPATIENT
Start: 2022-06-28 | End: 2022-06-28

## 2022-06-28 RX ORDER — SODIUM CHLORIDE 0.9 % (FLUSH) 0.9 %
10 SYRINGE (ML) INJECTION
Status: DISCONTINUED | OUTPATIENT
Start: 2022-06-28 | End: 2022-06-28 | Stop reason: HOSPADM

## 2022-06-28 RX ORDER — MIDAZOLAM HYDROCHLORIDE 1 MG/ML
INJECTION INTRAMUSCULAR; INTRAVENOUS
Status: DISCONTINUED | OUTPATIENT
Start: 2022-06-28 | End: 2022-06-28

## 2022-06-28 RX ORDER — CEFAZOLIN SODIUM 1 G/3ML
INJECTION, POWDER, FOR SOLUTION INTRAMUSCULAR; INTRAVENOUS
Status: DISCONTINUED | OUTPATIENT
Start: 2022-06-28 | End: 2022-06-28

## 2022-06-28 RX ORDER — MUPIROCIN 20 MG/G
OINTMENT TOPICAL 2 TIMES DAILY
Status: DISCONTINUED | OUTPATIENT
Start: 2022-06-28 | End: 2022-06-28 | Stop reason: HOSPADM

## 2022-06-28 RX ORDER — MUPIROCIN 20 MG/G
OINTMENT TOPICAL
Status: DISCONTINUED | OUTPATIENT
Start: 2022-06-28 | End: 2022-06-28 | Stop reason: HOSPADM

## 2022-06-28 RX ORDER — KETAMINE HCL IN 0.9 % NACL 50 MG/5 ML
SYRINGE (ML) INTRAVENOUS
Status: DISCONTINUED | OUTPATIENT
Start: 2022-06-28 | End: 2022-06-28

## 2022-06-28 RX ORDER — DEXMEDETOMIDINE HYDROCHLORIDE 100 UG/ML
INJECTION, SOLUTION INTRAVENOUS
Status: DISCONTINUED | OUTPATIENT
Start: 2022-06-28 | End: 2022-06-28

## 2022-06-28 RX ORDER — HALOPERIDOL 5 MG/ML
0.5 INJECTION INTRAMUSCULAR EVERY 10 MIN PRN
Status: DISCONTINUED | OUTPATIENT
Start: 2022-06-28 | End: 2022-06-28 | Stop reason: HOSPADM

## 2022-06-28 RX ORDER — BUPIVACAINE HYDROCHLORIDE 2.5 MG/ML
INJECTION, SOLUTION EPIDURAL; INFILTRATION; INTRACAUDAL
Status: DISCONTINUED | OUTPATIENT
Start: 2022-06-28 | End: 2022-06-28 | Stop reason: HOSPADM

## 2022-06-28 RX ORDER — ONDANSETRON 2 MG/ML
INJECTION INTRAMUSCULAR; INTRAVENOUS
Status: DISCONTINUED | OUTPATIENT
Start: 2022-06-28 | End: 2022-06-28

## 2022-06-28 RX ORDER — ROCURONIUM BROMIDE 10 MG/ML
INJECTION, SOLUTION INTRAVENOUS
Status: DISCONTINUED | OUTPATIENT
Start: 2022-06-28 | End: 2022-06-28

## 2022-06-28 RX ORDER — FENTANYL CITRATE 50 UG/ML
INJECTION, SOLUTION INTRAMUSCULAR; INTRAVENOUS
Status: DISCONTINUED | OUTPATIENT
Start: 2022-06-28 | End: 2022-06-28

## 2022-06-28 RX ORDER — LIDOCAINE HYDROCHLORIDE 10 MG/ML
INJECTION, SOLUTION INTRAVENOUS
Status: DISCONTINUED | OUTPATIENT
Start: 2022-06-28 | End: 2022-06-28

## 2022-06-28 RX ORDER — FAMOTIDINE 10 MG/ML
INJECTION INTRAVENOUS
Status: DISCONTINUED | OUTPATIENT
Start: 2022-06-28 | End: 2022-06-28

## 2022-06-28 RX ORDER — PROPOFOL 10 MG/ML
VIAL (ML) INTRAVENOUS
Status: DISCONTINUED | OUTPATIENT
Start: 2022-06-28 | End: 2022-06-28

## 2022-06-28 RX ORDER — FENTANYL CITRATE 50 UG/ML
25 INJECTION, SOLUTION INTRAMUSCULAR; INTRAVENOUS EVERY 5 MIN PRN
Status: DISCONTINUED | OUTPATIENT
Start: 2022-06-28 | End: 2022-06-28 | Stop reason: HOSPADM

## 2022-06-28 RX ORDER — METHOCARBAMOL 500 MG/1
1000 TABLET, FILM COATED ORAL ONCE
Status: COMPLETED | OUTPATIENT
Start: 2022-06-28 | End: 2022-06-28

## 2022-06-28 RX ORDER — CEFAZOLIN SODIUM/WATER 2 G/20 ML
2 SYRINGE (ML) INTRAVENOUS
Status: DISCONTINUED | OUTPATIENT
Start: 2022-06-28 | End: 2022-06-28 | Stop reason: HOSPADM

## 2022-06-28 RX ADMIN — LIDOCAINE HYDROCHLORIDE 100 MG: 10 INJECTION, SOLUTION INTRAVENOUS at 03:06

## 2022-06-28 RX ADMIN — HYDROMORPHONE HYDROCHLORIDE 0.2 MG: 1 INJECTION, SOLUTION INTRAMUSCULAR; INTRAVENOUS; SUBCUTANEOUS at 06:06

## 2022-06-28 RX ADMIN — SODIUM CHLORIDE: 9 INJECTION, SOLUTION INTRAVENOUS at 03:06

## 2022-06-28 RX ADMIN — GLYCOPYRROLATE 0.6 MG: 0.2 INJECTION, SOLUTION INTRAMUSCULAR; INTRAVENOUS at 04:06

## 2022-06-28 RX ADMIN — MIDAZOLAM HYDROCHLORIDE 2 MG: 1 INJECTION, SOLUTION INTRAMUSCULAR; INTRAVENOUS at 03:06

## 2022-06-28 RX ADMIN — ACETAMINOPHEN 1000 MG: 500 TABLET ORAL at 01:06

## 2022-06-28 RX ADMIN — CARBOXYMETHYLCELLULOSE SODIUM 2 DROP: 5 SOLUTION/ DROPS OPHTHALMIC at 03:06

## 2022-06-28 RX ADMIN — HYDROMORPHONE HYDROCHLORIDE 0.2 MG: 1 INJECTION, SOLUTION INTRAMUSCULAR; INTRAVENOUS; SUBCUTANEOUS at 05:06

## 2022-06-28 RX ADMIN — PROPOFOL 250 MG: 10 INJECTION, EMULSION INTRAVENOUS at 03:06

## 2022-06-28 RX ADMIN — DEXAMETHASONE SODIUM PHOSPHATE 8 MG: 4 INJECTION, SOLUTION INTRAMUSCULAR; INTRAVENOUS at 03:06

## 2022-06-28 RX ADMIN — ROCURONIUM BROMIDE 50 MG: 10 INJECTION, SOLUTION INTRAVENOUS at 03:06

## 2022-06-28 RX ADMIN — FENTANYL CITRATE 100 MCG: 50 INJECTION, SOLUTION INTRAMUSCULAR; INTRAVENOUS at 03:06

## 2022-06-28 RX ADMIN — SODIUM CHLORIDE, SODIUM GLUCONATE, SODIUM ACETATE, POTASSIUM CHLORIDE, MAGNESIUM CHLORIDE, SODIUM PHOSPHATE, DIBASIC, AND POTASSIUM PHOSPHATE: .53; .5; .37; .037; .03; .012; .00082 INJECTION, SOLUTION INTRAVENOUS at 04:06

## 2022-06-28 RX ADMIN — GLYCOPYRROLATE 0.2 MG: 0.2 INJECTION, SOLUTION INTRAMUSCULAR; INTRAVENOUS at 03:06

## 2022-06-28 RX ADMIN — ONDANSETRON 4 MG: 2 INJECTION INTRAMUSCULAR; INTRAVENOUS at 03:06

## 2022-06-28 RX ADMIN — DEXMEDETOMIDINE HYDROCHLORIDE 20 MCG: 100 INJECTION, SOLUTION, CONCENTRATE INTRAVENOUS at 03:06

## 2022-06-28 RX ADMIN — NEOSTIGMINE METHYLSULFATE 5 MG: 1 INJECTION INTRAVENOUS at 04:06

## 2022-06-28 RX ADMIN — Medication 20 MG: at 03:06

## 2022-06-28 RX ADMIN — MUPIROCIN: 20 OINTMENT TOPICAL at 01:06

## 2022-06-28 RX ADMIN — METHOCARBAMOL 1000 MG: 500 TABLET ORAL at 05:06

## 2022-06-28 RX ADMIN — FAMOTIDINE 20 MG: 10 INJECTION, SOLUTION INTRAVENOUS at 03:06

## 2022-06-28 RX ADMIN — CEFAZOLIN 2 G: 330 INJECTION, POWDER, FOR SOLUTION INTRAMUSCULAR; INTRAVENOUS at 03:06

## 2022-06-28 RX ADMIN — CELECOXIB 400 MG: 200 CAPSULE ORAL at 01:06

## 2022-06-28 NOTE — OP NOTE
[facility])  Orthopedic Surgery Department  Operative Note    SUMMARY     Date of Procedure: 6/28/2022     Surgeon: Allyson Walker MD    Assistant(s): Micah Centeno MD - resident assisting    Procedure:   left achilles repair (PARS)    Pre-Operative Diagnosis: Achilles rupture, left [S86.012A]    Post-Operative Diagnosis: Same    Tourniquet: Thigh tourniquet, 250mmHg, 55 minutes    Indications: See clinical notes for consent and indications including discussion of risks, benefits, and alternatives.    Description of procedure:     Patient was seen identified in the preoperative holding area.  Consent was reviewed and surgical site was marked.  The patient also met our anesthesia colleagues who have their own discussion about risks benefits and alternatives to anesthesia options.      Patient was then brought to the operating room.  Anesthesia: General.  Positioning:  Prone over chest rolls.  The arms and shoulders were positioned at 90 degree angles and well-padded.  We ensured that all bony prominences were well padded.  A well-padded thigh tourniquet was applied.    The left lower extremity was then prepped and draped routine sterile fashion.  A time-out was then performed confirming patient identification, procedure to be performed, laterality and site, proper equipment, and 2g ancef antibiotic infusion.    Preoperative exam: Negative jaeger, increased resting dorsiflexion and palpable defect.    Rupture site/defect was localized with direct palpation.  Longitudinal incision was marked centered over the rupture site extending proximally.  Leg was elevated exsanguinated with an Esmarch and tourniquet inflated.  Skin was incised sharply.  What remained of the paratenon was divided sharply.  Rupture ends were identified.    Findings: Complete Achilles rupture with frayed ends.    Proximal ruptured stump was freed up using an elevator within the paratenon.  It was then delivered into the wound.   Arthrex PARS suture passing jig was then advanced along the tendon inside the paratenon.  FiberTape and passing sutures were then passed according to technique guide.  Each was then individually checked for adequate purchase within the tendon.  Locking stitches were then created.  These were then set aside for later use.    Distal ruptured stump was similarly freed up using an elevator within the paratenon.  It was also delivered into the wound.  Arthrex PARS suture passing jig was then advanced along the tendon inside the paratenon.  FiberTape and passing sutures were then passed according to technique guide.  Each was then individually checked for adequate purchase within the tendon.  Locking stitches were then created.    Wound was then irrigated.  Holding the foot in plantar flexion, the FiberTape sutures were sequentially tied from the nearest stitch to the far this stitch.  This nicely restored the resting tension if not slightly increase tension.  Calf squeeze confirmed and there was active plantar flexion.      Paratenon was then closed with 3 O Monocryl.  Subcutaneous was closed with 3 O Monocryl.  Skin was closed with 3 O nylon.  An Adaptic, gauze, ABD padded dressing was then applied and secured with sterile cast padding.  A well-padded posterior slab splint was then applied in resting plantar flexion.      At the conclusion of procedure, all needle and sponge counts were correct.  The case postoperative de-briefing was held confirming the procedure performed, any relevant specimens and/or postop concerns, and patient disposition.      Complications: No    Estimated Blood Loss (EBL): Minimal           Implants: Arthrex PARS fibertape    Specimens: None           Condition: Good    Disposition: PACU - hemodynamically stable.    Postop plan:   1. Weight bearing status: NWB 4 weeks, then progress to WBAT per achilles protocol  2. Immobilization: Splint x 2 weeks and then boot if wound stable  --boot with  heel lifts and then start peeling one layer per week per the achilles protocol  3. DVT prophylaxis: aspirin daily  4. Follow-up: 2 weeks with PA; X-Rays: none, place PT orders  --recheck with Dr. Walker in 2 weeks, no x-rays needed  5. PT: Start date: 2 months postop  Rx/protocol and restrictions:   --Gait training, edema control/desensitization modalities  --NWB leftLE x4 weeks, immobilization: boot with heel lifts; may initiate boot weaning 10-12 weeks postop and transition remaining heel lifts into shoe   --AROM/AAROM and gentle strengthening/theraband work, but no PROM/manipulation until 3 months postop  --no eccentric exercises past neutral until 3 months postop

## 2022-06-28 NOTE — ANESTHESIA PREPROCEDURE EVALUATION
06/28/2022  Stephan Schwartz is a 31 y.o., male.      Pre-op Assessment    I have reviewed the Patient Summary Reports.     I have reviewed the Nursing Notes. I have reviewed the NPO Status.   I have reviewed the Medications.     Review of Systems  Anesthesia Hx:  No problems with previous Anesthesia  Denies Family Hx of Anesthesia complications.   Denies Personal Hx of Anesthesia complications.   Social:  Non-Smoker    Hematology/Oncology:  Hematology Normal   Oncology Normal     Cardiovascular:   Denies MI.  Denies CAD.       Pulmonary:  Pulmonary Normal Denies Pneumonia  Denies COPD.    Hepatic/GI:  Hepatic/GI Normal    Neurological:  Neurology Normal    Endocrine:  Endocrine Normal        Physical Exam  General: Well nourished and Cooperative    Airway:  Mallampati: II   Mouth Opening: Normal  Tongue: Normal  Neck ROM: Normal ROM    Dental:  Intact    Chest/Lungs:  Clear to auscultation, Normal Respiratory Rate    Heart:  Rate: Normal  Rhythm: Regular Rhythm    Abdomen:  Normal, Soft        Anesthesia Plan  Type of Anesthesia, risks & benefits discussed:    Anesthesia Type: Gen Supraglottic Airway, Gen ETT, MAC  Intra-op Monitoring Plan: Standard ASA Monitors  Post Op Pain Control Plan: IV/PO Opioids PRN and multimodal analgesia  Induction:  IV  Informed Consent: Informed consent signed with the Patient and all parties understand the risks and agree with anesthesia plan.  All questions answered.   ASA Score: 1    Ready For Surgery From Anesthesia Perspective.     .

## 2022-06-28 NOTE — INTERVAL H&P NOTE
The patient has been examined and the H&P has been reviewed:    I concur with the findings and changes have been noted since the H&P was written: Consent reviewed, site affirmed and marked.    Surgery risks, benefits and alternative options discussed and understood by patient/family.          There are no hospital problems to display for this patient.

## 2022-06-28 NOTE — PATIENT INSTRUCTIONS
Dr. Allyson Walker Postoperative Instructions  We are here for you before and after surgery.  Please review the below instructions.  If you have questions or concerns, contact our office ([phone]).  ACTIVITY: Your weight-bearing status indicates how much weight you may (or may not) put on leg after surgery. These guidelines should be strictly followed in order to protect the surgical site.  It is your responsibility to get ready for these restrictions, which is why I talk with you about them before surgery.  Remember, no matter what option is selected below - you just had surgery and healing takes time and rest - PLEASE limit how much you are out and about for first 2 weeks to help decrease pain and swelling.  []  Weight Bearing as tolerated - Safe to put as much weight as you feel comfortable doing on the involved leg.  [x] Non-Weight Bearing - No weight or standing on the involved leg which means the leg should NOT touch ground when you walk.  [] Toe-Touch Weight Bearing - You may touch foot on floor for balance only, but should not walk using the involved leg.  [] Heel-Weight Bearing - Full weight bearing on heel only; no weight on toes.    ELEVATION: It is important that you elevate your foot during the first 48 hours and as much as possible for the first week after surgery! Most foot and ankle surgical procedures are associated with swelling around the surgical site. Elevating your foot is one of the most important ways to limit the swelling and ultimately shorten your recovery time. The easiest technique is to lie down and elevate your foot on pillows or blankets. Your foot should at least be above the level of your heart. It is okay to get up for brief periods of time, but the majority of time the foot should be at rest and elevated.  ICE: Ice, similar to elevation, helps control the swelling and also provides pain relief. Typically, alternating twenty minutes on, twenty minutes off a couple times per  day for first 2-3 days. Use a barrier between the ice and your skin/dressing to make sure dressing does not get wet and skin does not get too cold.  In rare cases, I will tell you not to use ice to prevent making blood vessels constrict.  [x] ICE OK WITH BARRIER LAYER  [] POLARCARE (START POD#1, MAX 20MIN/HOUR)  DRESSING: Most dressings are not changed until your first postoperative visit. If we want you to change the dressing on your own, specific instructions and the appropriate supplies will be given to you in recovery before discharge. All dressings should be kept clean and dry. If you have problems with your dressing, please contact our office ([phone]) and you can be seen sooner for a new dressing.    [x] DO NOT CHANGE DRESSING           [] CHANGE DRESSING IN ___ DAYS  OTHER POSTOPERATIVE EQUIPMENT/INSTRUCTIONS:  []  POSTOPERATIVE SHOE: A postoperative hard-soled shoe has been provided to you to help protect your foot. This is particularly important for you to wear when you are up and walking, you should follow the weight bearing instructions you were given. The postoperative shoe does not need to be worn when you are in bed or resting.  [] POSTOPERATIVE BOOT: A boot has been placed to protect your ankle.  This is particularly important for you to wear, and you should follow the weight bearing instructions you were given.   []  May remove boot to sleep    [] Must keep boot on at all times  [x] POSTOPERATIVE SPLINT: A well-padded dressing with plaster (hard cast material) has been applied to your lower leg. This protects the surgery and helps control swelling. Do not remove it.  Do not bear weight it. It needs to remain dry. Do not attempt to scratch underneath it. If it feels too tight, elevation will usually decrease the swelling and improve your comfort. If the splint becomes soiled, wet or feels excessively tight even after elevation, contact our office and it can be changed if necessary.  [] PINS:  Pins have been used to hold your toes in the appropriate position during the healing process. You may have pin balls on the ends to help prevent snagging the pins on clothing or other objects. It is not unusual for pins to back out or protrude more than their original position. Do not attempt to push the pins back in. If a pin backs or seems loose, call our office. If a pin comes all the way out, this is not an emergency (and do not try to replace it). Please simply call our office during business hours to make us aware if this occurs and we will give you further instructions.  DISCOLORATION: It is common for the toes to swell after surgery and turn a bluish color, especially if the foot is in a position below the heart. If the toes turn dark blue, dark black or completely white, please call our office immediately ([phone]) or come to the emergency room. This is an emergency!  INFECTION: Infection is uncommon, especially the first week after surgery. A low grade fever (less than 101°F) is very common after surgery and is not a sign of infection. Call our office if you experience streaking redness up the leg, foul smell, excessive drainage and fever greater than 101° ([phone]).  DRIVING: Do not resume driving until you have permission from your surgeon.  Even if you have a small surgery and you feel ok to drive, it is important that you follow instructions given by your doctor for your own and others safety.      ANESTHESIA:    There are many ways that surgery can be safely performed without you experiencing pain during the procedure.  You will meet our anesthesia team on the day of surgery and they will discuss those options with you.      After surgery they will provide you with information about the type of anesthesia that you chose and what to expect as well as numbers to call.          MEDICATIONS:   BLOOD CLOTS: It is rare, but blood clots (also known as DVTs) can happen after foot and ankle  surgery.  Please make sure to discuss family or personal history of this with me.  For certain surgeries and higher risk patients, I will add a medication to prevent blood clots.   [x] Aspirin 81mg twice daily or 325 mg daily   []  Other medication - a separate prescription will be provided if needed   [] No medication needed - you should make sure to move around and move your ankle a couple times per day to keep blood circulating  NAUSEA: Nausea after surgery is common, due to lots of things related to your surgery, anesthesia and pain medications.  Let me or Lexei know if you would like a prescription for this.    CONSTIPATION: Constipation is also common, due to narcotic pain medications that slow down normal movement of your intestinal system.  I recommend you  an over the counter stool softener (Colace, Sennosides, Docusate, etc) and take it according to the instructions on the bottle/packaging while you are taking pain medications.  BONE HEALTH: For healing broken bones or bone fusions, I will often recommend supplementation with medications that help provide your body with building blocks of healing bone (calcium and vitamin D).  You can get these from any pharmacy or drug store and any formulation is fine.  [x] Calcium 600 mg twice daily and Vitamin D 4000 IU once daily  [] Recommend daily multivitamin and balanced diet  PAIN MEDICATIONS  Most people require pain medication after foot and ankle surgery. I understand this and want you to feel as comfortable as possible. I believe in multimodal pain treatment.  What that means is, our team will treat your pain using several different strategies and types of medications that target different ways the body feels pain.    In most cases, you will have regional anesthesia (a block) for your foot/ankle procedure. For many cases, regional anesthesia (numbing the nerves to the surgical area) avoids the need for a general anesthetic (going completely to  sleep with a breathing tube or mask). The other major advantage of regional anesthesia is the ability to provide excellent (and sometimes complete) pain relief for a long period of time after surgery. The anesthesiologist will talk to you about this your day of surgery.  Narcotics: You will be given a prescription for a narcotic pain medication.  Narcotic prescriptions are limited depending on the type of surgical procedure.  Our goal is for narcotics to be your second line medication, meaning you should only use it if the other pain management methods are not working for you.  I hope we can treat your pain through alternate strategies that you no longer need them (or at least are needing much less) 2 weeks after surgery.  Prior to surgery or the day of surgery, you will be given these prescriptions.  Refills for pain pills are best called into the clinic during office hours 1-2 days BEFORE you anticipate running out. As a rule, pain medications are not refilled after hours or on weekends.    Anti-inflammatories: These medications, also known as NSAIDs (i.e. Ibuprofen, Motrin, Naproxen, Aleve, Advil, Celebrex, etc) may be used in combination with pain medications (narcotics) and should be 1st line.  For certain surgeries, you may be given a prescription-strength NSAID, which should be taken as prescribed, but cannot be combined with other NSAID medications.  NSAIDs should be taken with food, and if you have concerns about these medications, consult your doctor prior to surgery.  For certain surgeries, I will tell you not to take NSAIDs as they can interfere with healing of fractures or fusions.   [x] NSAIDS (except Aspirin and Celebrex) should not be taken until 4-6 weeks after surgery    [] NSAIDs encouraged; take according to bottle instructions  Tylenol (aka Acetaminophen): This is a powerful pain reliever and may be taken as alternative to narcotics and NSAIDs (does not affect bone healing).  You should take  according to bottle instructions and not take more than a total of 3 grams (3,000 milligrams) per day. Keep in mind that the pain medication that was prescribed to you may have acetaminophen as part of it.  Consult your doctor or the pharmacy if you have any questions or concerns.  Gabapentin (aka Neurontin):  This is a nerve calming medication, which has been shown to improve pain after orthopedic surgery.  You may be given a prescription for this as it can help treat the nerve irritation and pain that is common after foot and ankle surgery.  It can make you sleepy so be careful when/if you take it during daytime.  Methocarbomol (aka Robaxin):  This is a muscle relaxer, which can help with muscle spasm and cramping pain after surgery.  You may be given a prescription for this as it can help treat muscle pain.  If you already take a muscle relaxer, please let Lexie or I know so as not to duplicate.  CONTACT INFO: A member of our clinical staff can be reached by calling [phone] during business hours (8 AM - 5 PM). We make every effort to return phone calls in a timely manner. If an emergency occurs after hours, an on-call provider can be reached at [phone] or you can go to a nearby urgent care or emergency room for evaluation.  WHEN TO CALL: If you are concerned, we are concerned! Do not hesitate to contact our office at any time.    MD Luis Flores, Medical Assistant  [phone]  [facility] [place] [address] Lexie Krueger, Physician Assistant    Maribell Odom, Medical Assistant  [phone]  [address] [place] [id number]

## 2022-06-28 NOTE — TRANSFER OF CARE
"Anesthesia Transfer of Care Note    Patient: Stephan Schwartz    Procedure(s) Performed: Procedure(s) (LRB):  REPAIR, TENDON, ACHILLES (Left)    Patient location: PACU    Anesthesia Type: general    Transport from OR: Transported from OR on 6-10 L/min O2 by face mask with adequate spontaneous ventilation    Post pain: adequate analgesia    Post assessment: no apparent anesthetic complications and tolerated procedure well    Post vital signs: stable    Level of consciousness: awake and alert    Nausea/Vomiting: no nausea/vomiting    Complications: none    Transfer of care protocol was followed      Last vitals:   Visit Vitals  /70 (BP Location: Right arm, Patient Position: Lying)   Pulse 77   Temp 37.3 °C (99.1 °F) (Oral)   Resp 16   Ht 5' 10" (1.778 m)   Wt 87.1 kg (192 lb)   SpO2 100%   BMI 27.55 kg/m²     "

## 2022-06-28 NOTE — BRIEF OP NOTE
[facility] - [facility] ([facility])  Brief Operative Note    Surgery Date: 6/28/2022     Surgeon(s) and Role:     * Allyson Walker MD - Primary    Assisting Surgeon: None    Pre-op Diagnosis:  Achilles rupture, left [S86.012A]    Post-op Diagnosis:  Post-Op Diagnosis Codes:     * Achilles rupture, left [S86.012A]    Procedure(s) (LRB):  REPAIR, TENDON, ACHILLES (Left)    Anesthesia: General    Operative Findings: see op note    Estimated Blood Loss: see op note         Specimens:   Specimen (24h ago, onward)            None            Discharge Note    OUTCOME: Patient tolerated treatment/procedure well without complication and is now ready for discharge.    DISPOSITION: Home or Self Care    FINAL DIAGNOSIS:  Achilles rupture, left    FOLLOWUP: In clinic    DISCHARGE INSTRUCTIONS:    Discharge Procedure Orders   Diet general     Call MD for:  temperature >100.4     Call MD for:  persistent nausea and vomiting     Call MD for:  severe uncontrolled pain     Call MD for:  difficulty breathing, headache or visual disturbances     Call MD for:  redness, tenderness, or signs of infection (pain, swelling, redness, odor or green/yellow discharge around incision site)     Call MD for:  hives     Call MD for:  persistent dizziness or light-headedness     Call MD for:  extreme fatigue     Leave dressing on - Keep it clean, dry, and intact until clinic visit

## 2022-06-28 NOTE — PLAN OF CARE
Pt is LOC x4. Discharge instructions reviewed with pt & family; verbalizes understanding. Dressings dry & intact. Pt denies any nausea @ present. Pt c/o 3/10 pain @ present. AVS given & prescriptions given @ bedside to wife earlier today.  Consents in chart.

## 2022-06-29 NOTE — ANESTHESIA POSTPROCEDURE EVALUATION
Anesthesia Post Evaluation    Patient: Stephan Schwartz    Procedure(s) Performed: Procedure(s) (LRB):  REPAIR, TENDON, ACHILLES (Left)    Final Anesthesia Type: general      Patient location during evaluation: PACU  Patient participation: Yes- Able to Participate  Level of consciousness: awake and alert  Post-procedure vital signs: reviewed and stable  Pain management: adequate  Airway patency: patent    PONV status at discharge: No PONV  Anesthetic complications: no      Cardiovascular status: blood pressure returned to baseline  Respiratory status: unassisted  Hydration status: euvolemic  Follow-up not needed.          Vitals Value Taken Time   /65 06/28/22 1817   Temp 36.4 °C (97.5 °F) 06/28/22 1815   Pulse 56 06/28/22 1819   Resp 19 06/28/22 1819   SpO2 96 % 06/28/22 1819   Vitals shown include unvalidated device data.      Event Time   Out of Recovery 18:00:00         Pain/Tomeka Score: Pain Rating Prior to Med Admin: 5 (6/28/2022  6:00 PM)  Pain Rating Post Med Admin: 3 (6/28/2022  6:15 PM)  Tomeka Score: 10 (6/28/2022  5:19 PM)

## 2022-07-12 ENCOUNTER — OFFICE VISIT (OUTPATIENT)
Dept: ORTHOPEDICS | Facility: CLINIC | Age: 32
End: 2022-07-12
Payer: COMMERCIAL

## 2022-07-12 VITALS — HEIGHT: 70 IN | BODY MASS INDEX: 27.55 KG/M2

## 2022-07-12 DIAGNOSIS — S86.012D RUPTURE OF LEFT ACHILLES TENDON, SUBSEQUENT ENCOUNTER: ICD-10-CM

## 2022-07-12 DIAGNOSIS — Z09 FOLLOW-UP EXAMINATION AFTER ORTHOPEDIC SURGERY: Primary | ICD-10-CM

## 2022-07-12 PROCEDURE — 99999 PR PBB SHADOW E&M-EST. PATIENT-LVL III: ICD-10-PCS | Mod: PBBFAC,,, | Performed by: PHYSICIAN ASSISTANT

## 2022-07-12 PROCEDURE — 1159F PR MEDICATION LIST DOCUMENTED IN MEDICAL RECORD: ICD-10-PCS | Mod: CPTII,S$GLB,, | Performed by: PHYSICIAN ASSISTANT

## 2022-07-12 PROCEDURE — 1159F MED LIST DOCD IN RCRD: CPT | Mod: CPTII,S$GLB,, | Performed by: PHYSICIAN ASSISTANT

## 2022-07-12 PROCEDURE — 1160F PR REVIEW ALL MEDS BY PRESCRIBER/CLIN PHARMACIST DOCUMENTED: ICD-10-PCS | Mod: CPTII,S$GLB,, | Performed by: PHYSICIAN ASSISTANT

## 2022-07-12 PROCEDURE — 1160F RVW MEDS BY RX/DR IN RCRD: CPT | Mod: CPTII,S$GLB,, | Performed by: PHYSICIAN ASSISTANT

## 2022-07-12 PROCEDURE — 99999 PR PBB SHADOW E&M-EST. PATIENT-LVL III: CPT | Mod: PBBFAC,,, | Performed by: PHYSICIAN ASSISTANT

## 2022-07-12 PROCEDURE — 3008F PR BODY MASS INDEX (BMI) DOCUMENTED: ICD-10-PCS | Mod: CPTII,S$GLB,, | Performed by: PHYSICIAN ASSISTANT

## 2022-07-12 PROCEDURE — 99024 POSTOP FOLLOW-UP VISIT: CPT | Mod: S$GLB,,, | Performed by: PHYSICIAN ASSISTANT

## 2022-07-12 PROCEDURE — 99024 PR POST-OP FOLLOW-UP VISIT: ICD-10-PCS | Mod: S$GLB,,, | Performed by: PHYSICIAN ASSISTANT

## 2022-07-12 PROCEDURE — 3008F BODY MASS INDEX DOCD: CPT | Mod: CPTII,S$GLB,, | Performed by: PHYSICIAN ASSISTANT

## 2022-07-12 NOTE — PROGRESS NOTES
Mr. Schwartz is here today for a post-operative visit after a left achilles repair (PARS) by Dr. Walker on 6-28-22.  he reports that he is doing okay. He denies pain.  he is not taking pain medication.  he denies fever, chills, and sweats since the time of the surgery. He is taking aspirin, tylenol, and naproxen. He has remained NWB.     Physical exam:  Post op dressing taken down.  Incision is clean, dry and intact. No warmth, erythema, drainage, or signs of infection. Sutures removed without difficulty. Can see slight PF with calf squeeze.     Assessment:  Post-op visit 2 weeks    Plan: He was placed in a tall boot with heel lifts. Remain NWB LLE. Order placed for PT to begin around 2 months po. RTC in 2 weeks for re-check.

## 2022-07-15 ENCOUNTER — OFFICE VISIT (OUTPATIENT)
Dept: INTERNAL MEDICINE | Facility: CLINIC | Age: 32
End: 2022-07-15
Payer: COMMERCIAL

## 2022-07-15 ENCOUNTER — HOSPITAL ENCOUNTER (OUTPATIENT)
Dept: CARDIOLOGY | Facility: CLINIC | Age: 32
Discharge: HOME | End: 2022-07-15
Payer: COMMERCIAL

## 2022-07-15 ENCOUNTER — PATIENT MESSAGE (OUTPATIENT)
Dept: INTERNAL MEDICINE | Facility: CLINIC | Age: 32
End: 2022-07-15

## 2022-07-15 ENCOUNTER — CLINICAL SUPPORT (OUTPATIENT)
Dept: INTERNAL MEDICINE | Facility: CLINIC | Age: 32
End: 2022-07-15
Payer: COMMERCIAL

## 2022-07-15 VITALS
TEMPERATURE: 98 F | WEIGHT: 191.88 LBS | SYSTOLIC BLOOD PRESSURE: 120 MMHG | HEART RATE: 68 BPM | DIASTOLIC BLOOD PRESSURE: 80 MMHG | HEIGHT: 70 IN | BODY MASS INDEX: 27.47 KG/M2

## 2022-07-15 DIAGNOSIS — Z00.00 ROUTINE GENERAL MEDICAL EXAMINATION AT A HEALTH CARE FACILITY: ICD-10-CM

## 2022-07-15 DIAGNOSIS — Z00.00 ROUTINE GENERAL MEDICAL EXAMINATION AT A HEALTH CARE FACILITY: Primary | ICD-10-CM

## 2022-07-15 DIAGNOSIS — Z00.00 ENCOUNTER FOR ANNUAL HEALTH EXAMINATION: Primary | ICD-10-CM

## 2022-07-15 LAB
ALBUMIN SERPL BCP-MCNC: 4.3 G/DL (ref 3.5–5.2)
ALP SERPL-CCNC: 72 U/L (ref 55–135)
ALT SERPL W/O P-5'-P-CCNC: 33 U/L (ref 10–44)
ANION GAP SERPL CALC-SCNC: 7 MMOL/L (ref 8–16)
AST SERPL-CCNC: 20 U/L (ref 10–40)
BILIRUB SERPL-MCNC: 0.6 MG/DL (ref 0.1–1)
BUN SERPL-MCNC: 17 MG/DL (ref 6–20)
CALCIUM SERPL-MCNC: 9.8 MG/DL (ref 8.7–10.5)
CHLORIDE SERPL-SCNC: 108 MMOL/L (ref 95–110)
CHOLEST SERPL-MCNC: 218 MG/DL (ref 120–199)
CHOLEST/HDLC SERPL: 4.2 {RATIO} (ref 2–5)
CO2 SERPL-SCNC: 28 MMOL/L (ref 23–29)
CREAT SERPL-MCNC: 0.8 MG/DL (ref 0.5–1.4)
ERYTHROCYTE [DISTWIDTH] IN BLOOD BY AUTOMATED COUNT: 12.2 % (ref 11.5–14.5)
EST. GFR  (AFRICAN AMERICAN): >60 ML/MIN/1.73 M^2
EST. GFR  (NON AFRICAN AMERICAN): >60 ML/MIN/1.73 M^2
ESTIMATED AVG GLUCOSE: 100 MG/DL (ref 68–131)
GLUCOSE SERPL-MCNC: 90 MG/DL (ref 70–110)
HBA1C MFR BLD: 5.1 % (ref 4–5.6)
HCT VFR BLD AUTO: 45 % (ref 40–54)
HDLC SERPL-MCNC: 52 MG/DL (ref 40–75)
HDLC SERPL: 23.9 % (ref 20–50)
HGB BLD-MCNC: 15.6 G/DL (ref 14–18)
LDLC SERPL CALC-MCNC: 151.2 MG/DL (ref 63–159)
MCH RBC QN AUTO: 31 PG (ref 27–31)
MCHC RBC AUTO-ENTMCNC: 34.7 G/DL (ref 32–36)
MCV RBC AUTO: 89 FL (ref 82–98)
NONHDLC SERPL-MCNC: 166 MG/DL
PLATELET # BLD AUTO: 226 K/UL (ref 150–450)
PMV BLD AUTO: 11.7 FL (ref 9.2–12.9)
POTASSIUM SERPL-SCNC: 4.5 MMOL/L (ref 3.5–5.1)
PROT SERPL-MCNC: 7.4 G/DL (ref 6–8.4)
RBC # BLD AUTO: 5.04 M/UL (ref 4.6–6.2)
SODIUM SERPL-SCNC: 143 MMOL/L (ref 136–145)
TRIGL SERPL-MCNC: 74 MG/DL (ref 30–150)
TSH SERPL DL<=0.005 MIU/L-ACNC: 1.66 UIU/ML (ref 0.4–4)
WBC # BLD AUTO: 7.03 K/UL (ref 3.9–12.7)

## 2022-07-15 PROCEDURE — 93005 EKG 12-LEAD: ICD-10-PCS | Mod: S$GLB,,, | Performed by: INTERNAL MEDICINE

## 2022-07-15 PROCEDURE — 99395 PR PREVENTIVE VISIT,EST,18-39: ICD-10-PCS | Mod: S$GLB,,, | Performed by: INTERNAL MEDICINE

## 2022-07-15 PROCEDURE — 85027 COMPLETE CBC AUTOMATED: CPT | Performed by: INTERNAL MEDICINE

## 2022-07-15 PROCEDURE — 99395 PREV VISIT EST AGE 18-39: CPT | Mod: S$GLB,,, | Performed by: INTERNAL MEDICINE

## 2022-07-15 PROCEDURE — 3079F DIAST BP 80-89 MM HG: CPT | Mod: CPTII,S$GLB,, | Performed by: INTERNAL MEDICINE

## 2022-07-15 PROCEDURE — 83036 HEMOGLOBIN GLYCOSYLATED A1C: CPT | Performed by: INTERNAL MEDICINE

## 2022-07-15 PROCEDURE — 84443 ASSAY THYROID STIM HORMONE: CPT | Performed by: INTERNAL MEDICINE

## 2022-07-15 PROCEDURE — 99999 PR PBB SHADOW E&M-EST. PATIENT-LVL III: ICD-10-PCS | Mod: PBBFAC,,, | Performed by: INTERNAL MEDICINE

## 2022-07-15 PROCEDURE — 3008F BODY MASS INDEX DOCD: CPT | Mod: CPTII,S$GLB,, | Performed by: INTERNAL MEDICINE

## 2022-07-15 PROCEDURE — 1159F MED LIST DOCD IN RCRD: CPT | Mod: CPTII,S$GLB,, | Performed by: INTERNAL MEDICINE

## 2022-07-15 PROCEDURE — 93010 ELECTROCARDIOGRAM REPORT: CPT | Mod: S$GLB,,, | Performed by: INTERNAL MEDICINE

## 2022-07-15 PROCEDURE — 99999 PR PBB SHADOW E&M-EST. PATIENT-LVL III: CPT | Mod: PBBFAC,,, | Performed by: INTERNAL MEDICINE

## 2022-07-15 PROCEDURE — 3044F HG A1C LEVEL LT 7.0%: CPT | Mod: CPTII,S$GLB,, | Performed by: INTERNAL MEDICINE

## 2022-07-15 PROCEDURE — 3074F PR MOST RECENT SYSTOLIC BLOOD PRESSURE < 130 MM HG: ICD-10-PCS | Mod: CPTII,S$GLB,, | Performed by: INTERNAL MEDICINE

## 2022-07-15 PROCEDURE — 36415 COLL VENOUS BLD VENIPUNCTURE: CPT | Performed by: INTERNAL MEDICINE

## 2022-07-15 PROCEDURE — 3044F PR MOST RECENT HEMOGLOBIN A1C LEVEL <7.0%: ICD-10-PCS | Mod: CPTII,S$GLB,, | Performed by: INTERNAL MEDICINE

## 2022-07-15 PROCEDURE — 80061 LIPID PANEL: CPT | Performed by: INTERNAL MEDICINE

## 2022-07-15 PROCEDURE — 93005 ELECTROCARDIOGRAM TRACING: CPT | Mod: S$GLB,,, | Performed by: INTERNAL MEDICINE

## 2022-07-15 PROCEDURE — 3008F PR BODY MASS INDEX (BMI) DOCUMENTED: ICD-10-PCS | Mod: CPTII,S$GLB,, | Performed by: INTERNAL MEDICINE

## 2022-07-15 PROCEDURE — 80053 COMPREHEN METABOLIC PANEL: CPT | Performed by: INTERNAL MEDICINE

## 2022-07-15 PROCEDURE — 3079F PR MOST RECENT DIASTOLIC BLOOD PRESSURE 80-89 MM HG: ICD-10-PCS | Mod: CPTII,S$GLB,, | Performed by: INTERNAL MEDICINE

## 2022-07-15 PROCEDURE — 3074F SYST BP LT 130 MM HG: CPT | Mod: CPTII,S$GLB,, | Performed by: INTERNAL MEDICINE

## 2022-07-15 PROCEDURE — 93010 EKG 12-LEAD: ICD-10-PCS | Mod: S$GLB,,, | Performed by: INTERNAL MEDICINE

## 2022-07-15 PROCEDURE — 1159F PR MEDICATION LIST DOCUMENTED IN MEDICAL RECORD: ICD-10-PCS | Mod: CPTII,S$GLB,, | Performed by: INTERNAL MEDICINE

## 2022-07-15 NOTE — PROGRESS NOTES
Subjective:       Patient ID: Stephan Schwartz is a 31 y.o. male.    Chief Complaint: [facility]      [facility]  Annual health exam. Reviewed medical, surgical, social and family history, medications, appropriate preventive health screenings, as well as vaccination history. Updates as noted below or in assessment and plan.     wellness exam through work ([employer]). Recent left achilles tendon repair post rupture while playing tennis. Currently on boot, non-weight bearing. Following with Ortho. On Tylenol and aspirin currently. Generally had been well. Active, watching diet. Hx of HLD. Feels like he's been limiting fats.    Review of Systems   Constitutional: Negative for chills, fatigue and fever.   HENT: Negative for hearing loss and sinus pain.    Eyes: Negative for visual disturbance.   Respiratory: Negative for cough, chest tightness and shortness of breath.    Cardiovascular: Negative for chest pain and leg swelling.   Gastrointestinal: Negative for abdominal pain, blood in stool, diarrhea and nausea.   Genitourinary: Negative for difficulty urinating, dysuria and frequency.   Musculoskeletal: Negative for arthralgias, gait problem and joint swelling.   Skin: Negative for rash and wound.   Neurological: Negative for dizziness, syncope, weakness, numbness and headaches.   Psychiatric/Behavioral: Negative for dysphoric mood. The patient is not nervous/anxious.        Past Medical History:   Diagnosis Date    Hyperlipidemia     Multiple nevi          Current Outpatient Medications:     acetaminophen (TYLENOL) 500 MG tablet, Take 2 tablets (1,000 mg total) by mouth every 8 (eight) hours., Disp: 90 tablet, Rfl: 0    aspirin 325 MG tablet, Take 1 tablet (325 mg total) by mouth once daily., Disp: 30 tablet, Rfl: 0    Past Surgical History:   Procedure Laterality Date    REPAIR OF ACHILLES TENDON Left 6/28/2022    Procedure: REPAIR, TENDON, ACHILLES;  Surgeon: Allyson Walker MD;  Location: [facility] OR;  Service:  Orthopedics;  Laterality: Left;       Family History   Problem Relation Age of Onset    No Known Problems Mother     Cancer Maternal Grandmother         breast    Diabetes Paternal Grandmother     Heart disease Paternal Grandmother     Colon cancer Neg Hx        Social History     Tobacco Use    Smoking status: Never Smoker    Smokeless tobacco: Never Used   Substance Use Topics    Alcohol use: Yes     Comment: Occasional    Drug use: Never       Immunization History   Administered Date(s) Administered    COVID-19, vector-nr, rS-Ad26, PF ([employer]) 07/20/2021    Influenza - Quadrivalent - PF *Preferred* (6 months and older) 10/23/2018, 10/29/2019    Tdap 08/28/2020         Objective:      Vitals:    07/15/22 0912   BP: (!) 142/85   Pulse: 68   Temp: 98.3 °F (36.8 °C)     Repeat /80    Physical Exam  Constitutional:       General: He is not in acute distress.     Appearance: Normal appearance. He is well-developed. He is not ill-appearing.   HENT:      Head: Normocephalic and atraumatic.      Right Ear: Hearing and tympanic membrane normal. There is no impacted cerumen.      Left Ear: Hearing and tympanic membrane normal. There is no impacted cerumen.   Eyes:      Extraocular Movements: Extraocular movements intact.      Conjunctiva/sclera: Conjunctivae normal.   Cardiovascular:      Rate and Rhythm: Normal rate and regular rhythm.      Heart sounds: Normal heart sounds. No murmur heard.  Pulmonary:      Effort: Pulmonary effort is normal. No respiratory distress.      Breath sounds: Normal breath sounds. No wheezing, rhonchi or rales.   Abdominal:      General: Abdomen is flat. There is no distension.      Palpations: Abdomen is soft.   Musculoskeletal:         General: No swelling.      Cervical back: No tenderness.      Right lower leg: No edema.      Left lower leg: No edema.      Comments: Left foot in boot.   Lymphadenopathy:      Cervical: No cervical adenopathy.   Skin:     General: Skin is  warm and dry.      Findings: No lesion or rash.   Neurological:      General: No focal deficit present.      Mental Status: He is alert and oriented to person, place, and time.      Cranial Nerves: No cranial nerve deficit.      Coordination: Coordination normal.      Gait: Gait normal.   Psychiatric:         Mood and Affect: Mood normal.         Behavior: Behavior normal.         Thought Content: Thought content normal.         Judgment: Judgment normal.         Recent Results (from the past 2016 hour(s))   Comprehensive metabolic panel    Collection Time: 07/15/22  8:53 AM   Result Value Ref Range    Sodium 143 136 - 145 mmol/L    Potassium 4.5 3.5 - 5.1 mmol/L    Chloride 108 95 - 110 mmol/L    CO2 28 23 - 29 mmol/L    Glucose 90 70 - 110 mg/dL    BUN 17 6 - 20 mg/dL    Creatinine 0.8 0.5 - 1.4 mg/dL    Calcium 9.8 8.7 - 10.5 mg/dL    Total Protein 7.4 6.0 - 8.4 g/dL    Albumin 4.3 3.5 - 5.2 g/dL    Total Bilirubin 0.6 0.1 - 1.0 mg/dL    Alkaline Phosphatase 72 55 - 135 U/L    AST 20 10 - 40 U/L    ALT 33 10 - 44 U/L    Anion Gap 7 (L) 8 - 16 mmol/L    eGFR if African American >60.0 >60 mL/min/1.73 m^2    eGFR if non African American >60.0 >60 mL/min/1.73 m^2   CBC Without Differential    Collection Time: 07/15/22  8:53 AM   Result Value Ref Range    WBC 7.03 3.90 - 12.70 K/uL    RBC 5.04 4.60 - 6.20 M/uL    Hemoglobin 15.6 14.0 - 18.0 g/dL    Hematocrit 45.0 40.0 - 54.0 %    MCV 89 82 - 98 fL    MCH 31.0 27.0 - 31.0 pg    MCHC 34.7 32.0 - 36.0 g/dL    RDW 12.2 11.5 - 14.5 %    Platelets 226 150 - 450 K/uL    MPV 11.7 9.2 - 12.9 fL   Lipid panel    Collection Time: 07/15/22  8:53 AM   Result Value Ref Range    Cholesterol 218 (H) 120 - 199 mg/dL    Triglycerides 74 30 - 150 mg/dL    HDL 52 40 - 75 mg/dL    LDL Cholesterol 151.2 63.0 - 159.0 mg/dL    HDL/Cholesterol Ratio 23.9 20.0 - 50.0 %    Total Cholesterol/HDL Ratio 4.2 2.0 - 5.0    Non-HDL Cholesterol 166 mg/dL   Hemoglobin A1c    Collection Time: 07/15/22   8:53 AM   Result Value Ref Range    Hemoglobin A1C 5.1 4.0 - 5.6 %    Estimated Avg Glucose 100 68 - 131 mg/dL          Assessment/Plan:     1) Health Maintenance updates:  - Vaccines up to date.  - Multiple nevi. No obviously concerning lesions but have discussed Derm every couple yrs reasonable.  - Blood pressure and glucose levels normal    2) Hyperlipidemia - Still mild LDL elevation with otherwise normal profile. Continue wt, diet, exercise focus. Discussed goal to get down to low 180's. Start reviewing nutrition labels for saturated fat content. Repeat levels 1 yr.    3) Left achilles tendon tear - S/P repair. Following with Ortho. Currently in boot.

## 2022-07-25 ENCOUNTER — PATIENT MESSAGE (OUTPATIENT)
Dept: ORTHOPEDICS | Facility: CLINIC | Age: 32
End: 2022-07-25
Payer: COMMERCIAL

## 2022-07-27 ENCOUNTER — OFFICE VISIT (OUTPATIENT)
Dept: ORTHOPEDICS | Facility: CLINIC | Age: 32
End: 2022-07-27
Payer: COMMERCIAL

## 2022-07-27 DIAGNOSIS — S86.012D RUPTURE OF LEFT ACHILLES TENDON, SUBSEQUENT ENCOUNTER: Primary | ICD-10-CM

## 2022-07-27 PROCEDURE — 99024 POSTOP FOLLOW-UP VISIT: CPT | Mod: S$GLB,,, | Performed by: PHYSICIAN ASSISTANT

## 2022-07-27 PROCEDURE — 3044F HG A1C LEVEL LT 7.0%: CPT | Mod: CPTII,S$GLB,, | Performed by: PHYSICIAN ASSISTANT

## 2022-07-27 PROCEDURE — 1159F MED LIST DOCD IN RCRD: CPT | Mod: CPTII,S$GLB,, | Performed by: PHYSICIAN ASSISTANT

## 2022-07-27 PROCEDURE — 1159F PR MEDICATION LIST DOCUMENTED IN MEDICAL RECORD: ICD-10-PCS | Mod: CPTII,S$GLB,, | Performed by: PHYSICIAN ASSISTANT

## 2022-07-27 PROCEDURE — 99024 PR POST-OP FOLLOW-UP VISIT: ICD-10-PCS | Mod: S$GLB,,, | Performed by: PHYSICIAN ASSISTANT

## 2022-07-27 PROCEDURE — 99999 PR PBB SHADOW E&M-EST. PATIENT-LVL II: ICD-10-PCS | Mod: PBBFAC,,, | Performed by: PHYSICIAN ASSISTANT

## 2022-07-27 PROCEDURE — 99999 PR PBB SHADOW E&M-EST. PATIENT-LVL II: CPT | Mod: PBBFAC,,, | Performed by: PHYSICIAN ASSISTANT

## 2022-07-27 PROCEDURE — 3044F PR MOST RECENT HEMOGLOBIN A1C LEVEL <7.0%: ICD-10-PCS | Mod: CPTII,S$GLB,, | Performed by: PHYSICIAN ASSISTANT

## 2022-07-27 NOTE — PROGRESS NOTES
Mr. Schwartz is here today for a post-operative visit after a left achilles repair (PARS) by Dr. Walker on 6-28-22.  he reports that he is doing okay. He denies pain.  he is not taking pain medication.  he denies fever, chills, and sweats since the time of the surgery. He is taking aspirin, tylenol, and naproxen. He has been compliant with boot. He has appointment with PT.     Physical exam:  Post op dressing taken down.  Incision is clean, dry and intact. No warmth, erythema, drainage, or signs of infection. Scabbing mid proximal incision,  Can see slight PF with calf squeeze.     Assessment:  Post-op visit 4 weeks    Plan:  1. Weight bearing status: NWB 4 weeks ok to progress to WBAT per achilles protocol  2. Immobilization: boot   --boot with heel lifts and then start peeling one layer per week per the achilles protocol  3. DVT prophylaxis: aspirin daily  4. Follow-up:recheck with Dr. Walker in 6 weeks, no x-rays needed      PT: Start date: 2 months postop: orders already placed,oksana has made appointment.  Rx/protocol and restrictions:   --Gait training, edema control/desensitization modalities  --NWB leftLE x4 weeks, immobilization: boot with heel lifts; may initiate boot weaning 10-12 weeks postop and transition remaining heel lifts into shoe   --AROM/AAROM and gentle strengthening/theraband work, but no PROM/manipulation until 3 months postop  --no eccentric exercises past neutral until 3 months postop

## 2022-07-29 ENCOUNTER — TELEPHONE (OUTPATIENT)
Dept: ORTHOPEDICS | Facility: CLINIC | Age: 32
End: 2022-07-29
Payer: COMMERCIAL

## 2022-07-29 NOTE — TELEPHONE ENCOUNTER
SPOKE TO PT AND SCHEDULED AN APPT AT  WITH ----- Message from Christine So MA sent at 7/27/2022  7:24 AM CDT -----  Per RR, pt need to follow-up with Dr. Wlaker for his six weeks f/u-9/2022. Please contact pt with follow-up. Thank you

## 2022-08-10 ENCOUNTER — PATIENT MESSAGE (OUTPATIENT)
Dept: ORTHOPEDICS | Facility: CLINIC | Age: 32
End: 2022-08-10
Payer: COMMERCIAL

## 2022-08-23 ENCOUNTER — CLINICAL SUPPORT (OUTPATIENT)
Dept: REHABILITATION | Facility: OTHER | Age: 32
End: 2022-08-23
Payer: COMMERCIAL

## 2022-08-23 DIAGNOSIS — S86.012D RUPTURE OF LEFT ACHILLES TENDON, SUBSEQUENT ENCOUNTER: ICD-10-CM

## 2022-08-23 DIAGNOSIS — Z74.09 IMPAIRED FUNCTIONAL MOBILITY, BALANCE, GAIT, AND ENDURANCE: ICD-10-CM

## 2022-08-23 DIAGNOSIS — M25.672 DECREASED RANGE OF MOTION OF LEFT ANKLE: ICD-10-CM

## 2022-08-23 DIAGNOSIS — Z09 FOLLOW-UP EXAMINATION AFTER ORTHOPEDIC SURGERY: ICD-10-CM

## 2022-08-23 DIAGNOSIS — R29.898 ANKLE WEAKNESS: ICD-10-CM

## 2022-08-23 PROCEDURE — 97110 THERAPEUTIC EXERCISES: CPT | Mod: PN

## 2022-08-23 PROCEDURE — 97161 PT EVAL LOW COMPLEX 20 MIN: CPT | Mod: PN

## 2022-08-23 NOTE — PLAN OF CARE
[facility]  Physical Therapy Initial Evaluation    Name: Stephan Schwartz  Clinic Number: [id number]    Therapy Diagnosis:   Encounter Diagnoses   Name Primary?    Follow-up examination after orthopedic surgery     Rupture of left Achilles tendon, subsequent encounter     Impaired functional mobility, balance, gait, and endurance     Decreased range of motion of left ankle     Ankle weakness      Physician: Lexie Krueger PA-C    Physician Orders: PT Eval and Treat. S/p left achilles repair (PARS) by Dr. Walker on 6-28-22. PT: Start date: 2 months postop.     Medical Diagnosis: S86.012D (ICD-10-CM) - Rupture of left Achilles tendon, subsequent encounter   Evaluation Date: 8/23/2022   Surgery Date: 6/28/2022  Authorization Period Expiration: 7/13/2023  Plan of Care Certification Period: 10/18/2022  Visit # / Visits authorized: 1/ 1    Time In: 8:30 am  Time Out: 9:15 am  Total Billable Time separate from evaluation : 16 minutes    Precautions: Standard and and as note below      8/23/2022: 8 weeks post op    Rx/protocol and restrictions:   --Gait training, edema control/desensitization modalities  --NWB leftLE x4 weeks, immobilization: boot with heel lifts; may initiate boot weaning 10-12 weeks postop and transition remaining heel lifts into shoe   --AROM/AAROM and gentle strengthening/theraband work, but no PROM/manipulation until 3 months postop  --no eccentric exercises past neutral until 3 months postop    Subjective   Date of onset: 6/26/2022  History of current condition - Stephan reports: injured L ankle on 6/26 and had surgical repaitr on 6/28.  he feels pretty good, especially in the morning. Has some sensitivity along the incision.        Past Medical History:   Diagnosis Date    Hyperlipidemia     Multiple nevi      Stephan Schwartz  has a past surgical history that includes Repair of Achilles tendon (Left, 6/28/2022).    Stephan currently has no medications in their  medication list.    Review of patient's allergies indicates:  No Known Allergies       Prior Therapy: none  Social History:  lives with their family  Occupation: working from home  Prior Level of Function: I with amb and ADL  Current Level of Function: amb with boot, sitting on a chair for showering    Pain:  Current 1/10, worst 3/10, best 1/10   Location: left ankle  Description: Aching, burning around incision  Aggravating Factors: standing, walking, and standing to shower  Easing Factors: ice    Pts goals: long term working out again to stay in shape, return to running. Short term, walk normally in tennis shoes.    Objective     Functional assessment:   - walking: amb with walking boot  - sit to stand: I    AROM  LE MMT  R  L    Hip flexion  5/5  5/5    Hip abduction  5/5  5/5    Hip extension  5/5  5/5    Hip ER  5/5  5/5    Hip IR  5/5  5/5    Knee extension  5/5  5/5    Knee flexion  5/5  5/5    Ankle dorsiflexion  5/5  2-/5    Ankle plantar flexion  5/5  3/5 *   Ankle inversion  5/5  3/5 *   Ankle eversion  5/5  3/5 *     * - no resistance given    Flexibility testing:  - hamstrings:         B: tight, R: -28 deg, L: -31 deg  - gastrocnemius:   B: tight, R: neutral, L: 10 deg from neutral  - piriformis:             B: tight, decreased 25%  - quadriceps:          B: WFL  - hip adductors:      B: tight, decreased 25%  - hip flexors:           B: WNL  - IT bands:              B: tight    Joint mobility:                         R                           L  Ankle dorsiflexion              Neutral                  10 deg  Ankle plantar flexion           55 deg                  20 deg  Ankle Inversion                   50 deg                  35 deg  Ankle eversion                    25 deg                  15      CMS Impairment/Limitation/Restriction for FOTO Ankle Survey    Therapist reviewed FOTO scores for Stephan Schwartz on 8/23/2022.   FOTO documents entered into [employer] - see Media section.    Limitation  Score: 38%  Category: Mobility    Current : CJ = at least 20% but < 40% impaired, limited or restricted  Goal: CI = at least 1% but < 20% impaired, limited or restricted        TREATMENT   Treatment Time In: 8:58 am  Treatment Time Out: 9:14 am  Total Treatment time separate from Evaluation time: 16 minutes    Stephan received therapeutic exercises to develop strength, ROM and flexibility for 16 minutes including:  Ankle circles 20 reps clockwise, 20 counterclockwise  Ankle pumps 20 reps  Ankle alphabet reps  Towel scrunches reps  Toe yoga reps      Home Exercises Provided and Patient Education Provided     Education provided:   - Discussed the role of the PTA on the Rehab Team. Discussed patient will be seen by a physical therapist minimally every 6th visit or every 30 days prior to being seen by PTA. Also discussed the use of the [facility] for communication.    Ankle circles   Ankle pumps  Ankle alphabet  Towel scrunches   Toe yoga    Written Home Exercises Provided: yes.  Exercises were reviewed and Stephan was able to demonstrate them prior to the end of the session.  Stephan demonstrated good  understanding of the education provided.     See EMR under Patient Instructions for exercises provided 8/23/2022.    Assessment   Stephan is a 31 y.o. male referred to outpatient Physical Therapy with a medical diagnosis of S86.012D (ICD-10-CM) - Rupture of left Achilles tendon, subsequent encounter. Pt presents to OP PT for post op rehabilitation following L Achilles tendon repair on 6/28/2022. His is 8 weeks and can begin AAROM/AAROM of the L ankle, but avoiding eccentric exercises past neutral and PROM/manipulations. Will proceed with ankle ROM exercises and pre gait training. He will begin to wean out of his boot in 2 weeks. Presenting with decreased L ankle ROM and strength.    Pt prognosis is Good.   Pt will benefit from skilled outpatient Physical Therapy to address the deficits stated above and in the  chart below, provide pt/family education, and to maximize pt's level of independence.     Plan of care discussed with patient: Yes  Pt's spiritual, cultural and educational needs considered and patient is agreeable to the plan of care and goals as stated below:     Anticipated Barriers for therapy: none    Medical Necessity is demonstrated by the following  History  Co-morbidities and personal factors that may impact the plan of care Co-morbidities:   none    Personal Factors:   no deficits     low   Examination  Body Structures and Functions, activity limitations and participation restrictions that may impact the plan of care Body Regions:   lower extremities    Body Systems:    gross symmetry  ROM  strength  gross coordinated movement  balance  gait    Participation Restrictions:   Limited per protocol    Activity limitations:   Learning and applying knowledge  no deficits    General Tasks and Commands  no deficits    Communication  no deficits    Mobility  lifting and carrying objects  walking  negotiating steps    Self care  standing to shower    Domestic Life  no deficits    Interactions/Relationships  no deficits    Life Areas  no deficits    Community and Social Life  no deficits         moderate   Clinical Presentation stable and uncomplicated low   Decision Making/ Complexity Score: low     Goals:  Short Term Goals: 4 weeks   1. Independent with HEP.  2. Amb in community without walking boot.  3. Report decreased L ankle pain < or =  2/10 with adls such as standing, walking, and standing to shower.  4. Increased MMT for B LE by 1/2 muscle grade to promote proper pelvic stability to decrease   L ankle pain < or =  2/10 with adls such as standing, walking, and standing to shower.    Long Term Goals: 8 weeks   5. I community ambulation with normal gait pattern.  6. Up/down steps with I with alternating step pattern.  7. Increase L ankle dorsiflexion to 10 deg.  8. Report decreased L ankle pain < or =  1/10  with adls such as standing, walking, and standing to shower.  9. Increased MMT for B LE by 1 muscle grade to promote proper pelvic stability to decrease L ankle pain < or =  1/10 with adls such as standing, walking, and standing to shower.   10. Increased flexibility in B hamstrings to -20 deg with 90/90 test to promote proper pelvic stability to decrease L ankle pain < or =  1/10 with adls such as standing, walking, and standing to shower.   11. Increased flexibility in B hip adductors, B piriformis, B hip flexors, B IT bands, and B quads to promote proper pelvic stability to decrease L ankle pain < or =  1/10 with adls such as standing, walking, and standing to shower.  12. Patient to achieve CI (at least 1% but less than 20% impaired, limited or restricted) level at 11% functional limitation on the FOTO Outcomes Measurement System.    Plan   Certification Period/Plan of care expiration: 8/23/2022 to 10/18/2022.    Outpatient Physical Therapy 2 times weekly for 8 weeks to include the following interventions: Gait Training, Manual Therapy, Moist Heat/ Ice, Neuromuscular Re-ed, Patient Education, Therapeutic Activities and Therapeutic Exercise.     Lino Pereiar, PT

## 2022-08-23 NOTE — PATIENT INSTRUCTIONS
ANKLE CIRCLES        Move your ankle in a circular clockwise pattern for several repetitions and then repeat in the reverse counterclockwise direction.     Perform 20 clockwise, 20 counterclockwise.    Copyright © 2022 [employer]    ANKLE ABC's           While in a seated position, write out the alphabet in the air with your big toe.    Your ankle should be moving as you perform this.    Perform one upper case, one lover case letters.    Copyright © 2022 [employer]    ANKLE PUMPS - AP          Bend your foot up and down at your ankle joint as shown.    Perform 20 reps    Copyright © 2022 [employer]      TOES: Towel Bunching        With involved straight toes on towel, bend toes bunching up towel _20_ times.  Do _1-2__ times per day.    Copyright © [employer]. All rights reserved.     Toe Spread        Sitting, spread toes, then bring them together.  Repeat _20__ times. Do _1-2__ sessions per day.    Copyright © [employer]. All rights reserved.         Toe Yoga        Sit or stand with foot in contact with the floor. Start by lifting big toe while other toes stay in contact with the ground. Then reverse the movement by pressing big toe into ground and lift other four toes. Keep sole of foot in contact with the ground at all times. Repeat on each foot.     Perform 20 reps    Copyright © 2022 [employer]

## 2022-08-25 NOTE — PROGRESS NOTES
[facility]   Physical Therapy Treatment Note     Name: Stephan Schwartz  Clinic Number: [id number]    Therapy Diagnosis:   Encounter Diagnoses   Name Primary?    Impaired functional mobility, balance, gait, and endurance Yes    Decreased range of motion of left ankle     Ankle weakness      Physician: Lexie Krueger PA-C    Visit Date: 8/26/2022    Medical Diagnosis: S86.012D (ICD-10-CM) - Rupture of left Achilles tendon, subsequent encounter   Evaluation Date: 8/23/2022   Surgery Date: 6/28/2022  Authorization Period Expiration: 7/13/2023  Plan of Care Certification Period: 10/18/2022  Visit # / Visits authorized: 1/20 +eval (2 visits total)   PTA Visit: 1/5     Precautions: standard    Time In: 7:45am  Time Out: 8:35am  Total Billable Time: 50 minutes    8/23/2022: 8 weeks post op     Rx/protocol and restrictions:   --Gait training, edema control/desensitization modalities  --NWB leftLE x4 weeks, immobilization: boot with heel lifts; may initiate boot weaning 10-12 weeks postop and transition remaining heel lifts into shoe   --AROM/AAROM and gentle strengthening/theraband work, but no PROM/manipulation until 3 months postop  --no eccentric exercises past neutral until 3 months postop    SUBJECTIVE     Pt reports: He is not experiencing pain in his L ankle region today. He has been performing the exercises given to him at  everyday. He is walking in the boot provided to him.   He was compliant with home exercise program.  Response to previous treatment: Good response, able to perform HEP independently.   Functional change: Continues to ambulate in boot    Pain: 0/10  Location: left ankle     OBJECTIVE     Objective Measures updated at progress report unless specified.       TREATMENT     Stephan received the treatments listed in bold below:      Patient received therapeutic exercises for 50 minutes for improved strength and AROM including:  Ankle circles CW, CCW x30 each  Supine Ankle  pumps x30  +Supine AROM Inv/Ev x30   Toe yoga x30  Toe spreading x30  +Towel scrunches x3 minutes  +Marbles 2x   +Seated arch lifts x30  +Clams 3x10  +SL hip abd 3x10  +Prone hip ext 3x10        PATIENT EDUCATION AND HOME EXERCISES     Home Exercises Provided and Patient Education Provided     Education provided:   PT educated pt on importance of compliance with their HEP this visit.     Written Home Exercises Provided: Patient instructed to cont prior HEP. Exercises were reviewed and Stephan was able to demonstrate them prior to the end of the session.  Stephan demonstrated good  understanding of the education provided. See EMR under Patient Instructions for exercises provided during therapy sessions    ASSESSMENT   Pt tolerated tx session well today and completed all therapeutic exercises with no increase in L ankle pain. Progressed AROM exercises this visit with good tolerance, patient displayed good understanding of new exercises given. Added hip strengthening exercises, good muscular fatigue with abduction/extension motions. Educated patient on protocol restrictions with good understanding.     Stephan Is progressing well towards his goals.   Pt prognosis is Good.     Pt will continue to benefit from skilled outpatient physical therapy to address the deficits listed in the problem list box on initial evaluation, provide pt/family education and to maximize pt's level of independence in the home and community environment.     Pt's spiritual, cultural and educational needs considered and pt agreeable to plan of care and goals.     Anticipated barriers to physical therapy: None    Goals:   Goals:  Short Term Goals: 4 weeks   1. Independent with HEP.  2. Amb in community without walking boot.  3. Report decreased L ankle pain < or =  2/10 with adls such as standing, walking, and standing to shower.  4. Increased MMT for B LE by 1/2 muscle grade to promote proper pelvic stability to decrease   L ankle pain < or =   2/10 with adls such as standing, walking, and standing to shower.     Long Term Goals: 8 weeks   5. I community ambulation with normal gait pattern.  6. Up/down steps with I with alternating step pattern.  7. Increase L ankle dorsiflexion to 10 deg.  8. Report decreased L ankle pain < or =  1/10 with adls such as standing, walking, and standing to shower.  9. Increased MMT for B LE by 1 muscle grade to promote proper pelvic stability to decrease L ankle pain < or =  1/10 with adls such as standing, walking, and standing to shower.   10. Increased flexibility in B hamstrings to -20 deg with 90/90 test to promote proper pelvic stability to decrease L ankle pain < or =  1/10 with adls such as standing, walking, and standing to shower.   11. Increased flexibility in B hip adductors, B piriformis, B hip flexors, B IT bands, and B quads to promote proper pelvic stability to decrease L ankle pain < or =  1/10 with adls such as standing, walking, and standing to shower.  12. Patient to achieve CI (at least 1% but less than 20% impaired, limited or restricted) level at 11% functional limitation on the FOTO Outcomes Measurement System.      PLAN   Certification Period/Plan of care expiration: 8/23/2022 to 10/18/2022.     Outpatient Physical Therapy 2 times weekly for 8 weeks to include the following interventions: Gait Training, Manual Therapy, Moist Heat/ Ice, Neuromuscular Re-ed, Patient Education, Therapeutic Activities and Therapeutic Exercise.            Jimi Burgess, PTA

## 2022-08-26 ENCOUNTER — CLINICAL SUPPORT (OUTPATIENT)
Dept: REHABILITATION | Facility: OTHER | Age: 32
End: 2022-08-26
Payer: COMMERCIAL

## 2022-08-26 DIAGNOSIS — Z74.09 IMPAIRED FUNCTIONAL MOBILITY, BALANCE, GAIT, AND ENDURANCE: Primary | ICD-10-CM

## 2022-08-26 DIAGNOSIS — R29.898 ANKLE WEAKNESS: ICD-10-CM

## 2022-08-26 DIAGNOSIS — M25.672 DECREASED RANGE OF MOTION OF LEFT ANKLE: ICD-10-CM

## 2022-08-26 PROCEDURE — 97110 THERAPEUTIC EXERCISES: CPT | Mod: PN,CQ

## 2022-09-02 ENCOUNTER — CLINICAL SUPPORT (OUTPATIENT)
Dept: REHABILITATION | Facility: OTHER | Age: 32
End: 2022-09-02
Payer: COMMERCIAL

## 2022-09-02 DIAGNOSIS — R29.898 ANKLE WEAKNESS: ICD-10-CM

## 2022-09-02 DIAGNOSIS — Z74.09 IMPAIRED FUNCTIONAL MOBILITY, BALANCE, GAIT, AND ENDURANCE: Primary | ICD-10-CM

## 2022-09-02 DIAGNOSIS — M25.672 DECREASED RANGE OF MOTION OF LEFT ANKLE: ICD-10-CM

## 2022-09-02 PROCEDURE — 97110 THERAPEUTIC EXERCISES: CPT | Mod: PN,CQ

## 2022-09-02 NOTE — PROGRESS NOTES
[facility]   Physical Therapy Treatment Note     Name: Stephan Schwartz  Clinic Number: [id number]    Therapy Diagnosis:   Encounter Diagnoses   Name Primary?    Impaired functional mobility, balance, gait, and endurance Yes    Decreased range of motion of left ankle     Ankle weakness        Physician: Lexie Krueger PA-C    Visit Date: 9/2/2022    Medical Diagnosis: S86.012D (ICD-10-CM) - Rupture of left Achilles tendon, subsequent encounter   Evaluation Date: 8/23/2022   Surgery Date: 6/28/2022  Authorization Period Expiration: 7/13/2023  Plan of Care Certification Period: 10/18/2022  Visit # / Visits authorized: 2/20 +eval (3 visits total)   PTA Visit: 2/5    Precautions: standard    Time In: 7:03am  Time Out: 7:48am  Total Billable Time: 45 minutes    8/23/2022: 8 weeks post op     Rx/protocol and restrictions:   --Gait training, edema control/desensitization modalities  --NWB leftLE x4 weeks, immobilization: boot with heel lifts; may initiate boot weaning 10-12 weeks postop and transition remaining heel lifts into shoe   --AROM/AAROM and gentle strengthening/theraband work, but no PROM/manipulation until 3 months postop  --no eccentric exercises past neutral until 3 months postop    SUBJECTIVE     Pt reports: He is not experiencing pain in his L ankle region today. He has been performing the HEP given to him, has bought an ankle [employer] board from [employer] to help with ankle mobility. Able to  the shower now.   He was compliant with home exercise program.  Response to previous treatment: Good response, able to perform HEP independently.   Functional change: Continues to ambulate in boot    Pain: 0/10  Location: left ankle     OBJECTIVE     Objective Measures updated at progress report unless specified.       TREATMENT     Stephan received the treatments listed in bold below:      Patient received therapeutic exercises for 45 minutes for improved strength and AROM  including:  Ankle circles CW, CCW x30 each   Supine Ankle pumps x30  +Sidelying  AROM Inv/Ev x30   +Ankle 3 way (no DF) vignesh band    Toe yoga x30   Toe spreading x30   Towel scrunches x3 minutes   Marbles 2x   Seated arch lifts x30  Clams 3x10  SL hip abd 3x10  Prone hip ext 3x10        PATIENT EDUCATION AND HOME EXERCISES     Home Exercises Provided and Patient Education Provided     Education provided:   PT educated pt on importance of compliance with their HEP this visit.     Written Home Exercises Provided: Patient instructed to cont prior HEP. Exercises were reviewed and Stephan was able to demonstrate them prior to the end of the session.  Stephan demonstrated good  understanding of the education provided. See EMR under Patient Instructions for exercises provided during therapy sessions    ASSESSMENT   Pt tolerated tx session well today and completed all therapeutic exercises with no increase in L ankle pain. Progressed AROM exercises this visit with good tolerance, added gentle theraband strengthening. Continued hip strengthening exercises, good muscular fatigue with abduction/extension motions.      Stephan Is progressing well towards his goals.   Pt prognosis is Good.     Pt will continue to benefit from skilled outpatient physical therapy to address the deficits listed in the problem list box on initial evaluation, provide pt/family education and to maximize pt's level of independence in the home and community environment.     Pt's spiritual, cultural and educational needs considered and pt agreeable to plan of care and goals.     Anticipated barriers to physical therapy: None    Goals:   Goals:  Short Term Goals: 4 weeks   Independent with HEP.  Amb in community without walking boot.  Report decreased L ankle pain < or =  2/10 with adls such as standing, walking, and standing to shower.  Increased MMT for B LE by 1/2 muscle grade to promote proper pelvic stability to decrease   L ankle pain < or =  2/10  with adls such as standing, walking, and standing to shower.     Long Term Goals: 8 weeks   I community ambulation with normal gait pattern.  Up/down steps with I with alternating step pattern.  Increase L ankle dorsiflexion to 10 deg.  Report decreased L ankle pain < or =  1/10 with adls such as standing, walking, and standing to shower.  Increased MMT for B LE by 1 muscle grade to promote proper pelvic stability to decrease L ankle pain < or =  1/10 with adls such as standing, walking, and standing to shower.   Increased flexibility in B hamstrings to -20 deg with 90/90 test to promote proper pelvic stability to decrease L ankle pain < or =  1/10 with adls such as standing, walking, and standing to shower.   Increased flexibility in B hip adductors, B piriformis, B hip flexors, B IT bands, and B quads to promote proper pelvic stability to decrease L ankle pain < or =  1/10 with adls such as standing, walking, and standing to shower.  Patient to achieve CI (at least 1% but less than 20% impaired, limited or restricted) level at 11% functional limitation on the FOTO Outcomes Measurement System.      PLAN   Certification Period/Plan of care expiration: 8/23/2022 to 10/18/2022.     Outpatient Physical Therapy 2 times weekly for 8 weeks to include the following interventions: Gait Training, Manual Therapy, Moist Heat/ Ice, Neuromuscular Re-ed, Patient Education, Therapeutic Activities and Therapeutic Exercise.            Jimi Burgess, PTA

## 2022-09-08 ENCOUNTER — CLINICAL SUPPORT (OUTPATIENT)
Dept: REHABILITATION | Facility: OTHER | Age: 32
End: 2022-09-08
Payer: COMMERCIAL

## 2022-09-08 DIAGNOSIS — M25.672 DECREASED RANGE OF MOTION OF LEFT ANKLE: ICD-10-CM

## 2022-09-08 DIAGNOSIS — R29.898 ANKLE WEAKNESS: ICD-10-CM

## 2022-09-08 DIAGNOSIS — Z74.09 IMPAIRED FUNCTIONAL MOBILITY, BALANCE, GAIT, AND ENDURANCE: Primary | ICD-10-CM

## 2022-09-08 PROCEDURE — 97110 THERAPEUTIC EXERCISES: CPT | Mod: PN

## 2022-09-08 PROCEDURE — 97112 NEUROMUSCULAR REEDUCATION: CPT | Mod: PN

## 2022-09-08 NOTE — PROGRESS NOTES
[facility]   Physical Therapy Treatment Note     Name: Stephan Schwartz  Clinic Number: [id number]    Therapy Diagnosis:   Encounter Diagnoses   Name Primary?    Impaired functional mobility, balance, gait, and endurance Yes    Decreased range of motion of left ankle     Ankle weakness      Physician: Lexie Krueger PA-C    Visit Date: 9/8/2022    Medical Diagnosis: S86.012D (ICD-10-CM) - Rupture of left Achilles tendon, subsequent encounter   Evaluation Date: 8/23/2022   Surgery Date: 6/28/2022  Authorization Period Expiration: 12/31/2022  Plan of Care Certification Period: 10/18/2022  Visit # / Visits authorized: 3/20 +eval (4 visits total)   PTA Visit: 0/5    Precautions: standard    Time In: 2:39 pm   Time Out: 3:25 pm   Total Billable Time: 45 minutes    9/6/2022: 10 weeks post op     Rx/protocol and restrictions:   --Gait training, edema control/desensitization modalities  --NWB leftLE x4 weeks, immobilization: boot with heel lifts; may initiate boot weaning 10-12 weeks postop and transition remaining heel lifts into shoe   --AROM/AAROM and gentle strengthening/theraband work, but no PROM/manipulation until 3 months postop  --no eccentric exercises past neutral until 3 months postop    SUBJECTIVE     Pt reports: it remains sore and stiff feeling over the scar area.  He reports taking showers without the boot.  He states he is at week 10 and has an appointment tomorrow with the surgeon.  He has been neutral in the boot for a few weeks now and is ready to get out of it.   He was compliant with home exercise program.  Response to previous treatment: Good response, able to perform HEP independently.   Functional change: Continues to ambulate in boot    Pain: 0/10  Location: left ankle     OBJECTIVE     Objective Measures updated at progress report unless specified.       TREATMENT     Stephan received the treatments listed in bold below:      Patient received therapeutic exercises for 37  minutes for improved strength and AROM including:  Ankle circles CW, CCW x30 each   Ankle alphabet upper and lower x1   Toe spreading x30   Towel scrunches x3 minutes  Archersizer x30  +Sidelying  AROM Inv/Ev x30     Ankle 3 way (no DF) vignesh band    Supine Ankle pumps x30  Toe yoga x30   Marbles 2x   Seated arch lifts x30  Clams 3x10  SL hip abd 3x10  Prone hip ext 3x10    Stephan participated in neuromuscular re-education activities to improve: Proprioception for 8 minutes. The following activities were included:  Lateral weight shifting to tolerance with 5 second holds on L 3x 1 minute   A/P weight shifting to tolerance onto L foot with 5 second holds 3x 1 minute     PATIENT EDUCATION AND HOME EXERCISES     Home Exercises Provided and Patient Education Provided     Education provided:   PT educated pt on importance of compliance with their HEP this visit.     Written Home Exercises Provided: Patient instructed to cont prior HEP. Exercises were reviewed and Stephan was able to demonstrate them prior to the end of the session.  Stephan demonstrated good  understanding of the education provided. See EMR under Patient Instructions for exercises provided during therapy sessions    ASSESSMENT     Initiated WB without boot today as patient has reached the 10 week james.  Denies pain but notes more fatigue in L LE.  Discussed transition phases to wean from boot as needed.  Good performance of inversion and eversion without compensations.  Follow up with MD tomorrow.     Stephan Is progressing well towards his goals.   Pt prognosis is Good.     Pt will continue to benefit from skilled outpatient physical therapy to address the deficits listed in the problem list box on initial evaluation, provide pt/family education and to maximize pt's level of independence in the home and community environment.     Pt's spiritual, cultural and educational needs considered and pt agreeable to plan of care and goals.     Anticipated  barriers to physical therapy: None    Goals:   Short Term Goals: 4 weeks   Independent with HEP.  Amb in community without walking boot.  Report decreased L ankle pain < or =  2/10 with adls such as standing, walking, and standing to shower.  Increased MMT for B LE by 1/2 muscle grade to promote proper pelvic stability to decrease   L ankle pain < or =  2/10 with adls such as standing, walking, and standing to shower.     Long Term Goals: 8 weeks   I community ambulation with normal gait pattern.  Up/down steps with I with alternating step pattern.  Increase L ankle dorsiflexion to 10 deg.  Report decreased L ankle pain < or =  1/10 with adls such as standing, walking, and standing to shower.  Increased MMT for B LE by 1 muscle grade to promote proper pelvic stability to decrease L ankle pain < or =  1/10 with adls such as standing, walking, and standing to shower.   Increased flexibility in B hamstrings to -20 deg with 90/90 test to promote proper pelvic stability to decrease L ankle pain < or =  1/10 with adls such as standing, walking, and standing to shower.   Increased flexibility in B hip adductors, B piriformis, B hip flexors, B IT bands, and B quads to promote proper pelvic stability to decrease L ankle pain < or =  1/10 with adls such as standing, walking, and standing to shower.  Patient to achieve CI (at least 1% but less than 20% impaired, limited or restricted) level at 11% functional limitation on the FOTO Outcomes Measurement System.      PLAN   Certification Period/Plan of care expiration: 8/23/2022 to 10/18/2022.     Outpatient Physical Therapy 2 times weekly for 8 weeks to include the following interventions: Gait Training, Manual Therapy, Moist Heat/ Ice, Neuromuscular Re-ed, Patient Education, Therapeutic Activities and Therapeutic Exercise.            Cindy Waite, PT

## 2022-09-09 ENCOUNTER — OFFICE VISIT (OUTPATIENT)
Dept: ORTHOPEDICS | Facility: CLINIC | Age: 32
End: 2022-09-09
Payer: COMMERCIAL

## 2022-09-09 VITALS — BODY MASS INDEX: 27.46 KG/M2 | HEIGHT: 70 IN | WEIGHT: 191.81 LBS

## 2022-09-09 DIAGNOSIS — Z09 FOLLOW-UP EXAMINATION AFTER ORTHOPEDIC SURGERY: ICD-10-CM

## 2022-09-09 DIAGNOSIS — S86.012D RUPTURE OF LEFT ACHILLES TENDON, SUBSEQUENT ENCOUNTER: Primary | ICD-10-CM

## 2022-09-09 PROCEDURE — 1159F MED LIST DOCD IN RCRD: CPT | Mod: CPTII,S$GLB,, | Performed by: ORTHOPAEDIC SURGERY

## 2022-09-09 PROCEDURE — 3044F PR MOST RECENT HEMOGLOBIN A1C LEVEL <7.0%: ICD-10-PCS | Mod: CPTII,S$GLB,, | Performed by: ORTHOPAEDIC SURGERY

## 2022-09-09 PROCEDURE — 1160F PR REVIEW ALL MEDS BY PRESCRIBER/CLIN PHARMACIST DOCUMENTED: ICD-10-PCS | Mod: CPTII,S$GLB,, | Performed by: ORTHOPAEDIC SURGERY

## 2022-09-09 PROCEDURE — 3008F PR BODY MASS INDEX (BMI) DOCUMENTED: ICD-10-PCS | Mod: CPTII,S$GLB,, | Performed by: ORTHOPAEDIC SURGERY

## 2022-09-09 PROCEDURE — 3008F BODY MASS INDEX DOCD: CPT | Mod: CPTII,S$GLB,, | Performed by: ORTHOPAEDIC SURGERY

## 2022-09-09 PROCEDURE — 1160F RVW MEDS BY RX/DR IN RCRD: CPT | Mod: CPTII,S$GLB,, | Performed by: ORTHOPAEDIC SURGERY

## 2022-09-09 PROCEDURE — 1159F PR MEDICATION LIST DOCUMENTED IN MEDICAL RECORD: ICD-10-PCS | Mod: CPTII,S$GLB,, | Performed by: ORTHOPAEDIC SURGERY

## 2022-09-09 PROCEDURE — 99024 PR POST-OP FOLLOW-UP VISIT: ICD-10-PCS | Mod: S$GLB,,, | Performed by: ORTHOPAEDIC SURGERY

## 2022-09-09 PROCEDURE — 3044F HG A1C LEVEL LT 7.0%: CPT | Mod: CPTII,S$GLB,, | Performed by: ORTHOPAEDIC SURGERY

## 2022-09-09 PROCEDURE — 99999 PR PBB SHADOW E&M-EST. PATIENT-LVL III: ICD-10-PCS | Mod: PBBFAC,,, | Performed by: ORTHOPAEDIC SURGERY

## 2022-09-09 PROCEDURE — 99999 PR PBB SHADOW E&M-EST. PATIENT-LVL III: CPT | Mod: PBBFAC,,, | Performed by: ORTHOPAEDIC SURGERY

## 2022-09-09 PROCEDURE — 99024 POSTOP FOLLOW-UP VISIT: CPT | Mod: S$GLB,,, | Performed by: ORTHOPAEDIC SURGERY

## 2022-09-09 NOTE — PROGRESS NOTES
Subjective:   Chief complaint: Follow-up Left achilles    6/28/22 - Left achilles tendon repair PARS    HPI:   Stephan Schwartz is a 31 y.o. male who presents today 10 weeks 3days postop follow-up. Pain 0/10, overall the patient reports doing well.  The patient reports appropriate postoperative soreness with well controlled overall pain. he is not taking pain medication.  he denies fever, chills, and sweats since the time of the surgery. He     ROS:  Musculoskeletal: per HPI     Objective:   Exam:  There were no vitals filed for this visit.  General: No acute distress, well-appearing  Musculoskeletal: Standing examination deferred.       Incision well-healed    Fires TA/GSC/PTT/peroneals guarded.  SILT SP/DP/PT with no paresthesias.     Imaging:  None          Assessment:     1. Rupture of left Achilles tendon, subsequent encounter    2. Follow-up examination after orthopedic surgery        10 weeks postop left Achilles tendon repair     Plan:       Weight bearing: Weight bearing as tolerated left leg  Immobilization: Tall boot when mobilizing; may remove for sleep and hygiene and and then wean out of boot/postop shoe into normal supportive shoe as pain and swelling allow at 12 weeks, can begin boot weaning in therapy at 10 weeks  Therapy: Continue PT  Blood clot prevention: None needed; encouraged mobilization  Wound care: None  Showering: No restrictions.  Pain meds: No refills needed  Nutrition: Recommend balanced diet  Follow-up: 3 months with no x-rays needed     No orders of the defined types were placed in this encounter.      Past Medical History:   Diagnosis Date    Hyperlipidemia     Multiple nevi        Past Surgical History:   Procedure Laterality Date    REPAIR OF ACHILLES TENDON Left 6/28/2022    Procedure: REPAIR, TENDON, ACHILLES;  Surgeon: Allyson Walker MD;  Location: [facility];  Service: Orthopedics;  Laterality: Left;       Family History   Problem Relation Age of Onset    No Known Problems Mother      Cancer Maternal Grandmother         breast    Diabetes Paternal Grandmother     Heart disease Paternal Grandmother     Colon cancer Neg Hx        Social History     Socioeconomic History    Marital status:    Occupational History    Occupation: finance   Tobacco Use    Smoking status: Never    Smokeless tobacco: Never   Substance and Sexual Activity    Alcohol use: Yes     Comment: Occasional    Drug use: Never    Sexual activity: Yes     Partners: Female   Social History Narrative    Exercises regularly - body weights, aerobics; doing something daily.

## 2022-09-23 ENCOUNTER — CLINICAL SUPPORT (OUTPATIENT)
Dept: REHABILITATION | Facility: OTHER | Age: 32
End: 2022-09-23
Payer: COMMERCIAL

## 2022-09-23 DIAGNOSIS — Z74.09 IMPAIRED FUNCTIONAL MOBILITY, BALANCE, GAIT, AND ENDURANCE: Primary | ICD-10-CM

## 2022-09-23 DIAGNOSIS — R29.898 ANKLE WEAKNESS: ICD-10-CM

## 2022-09-23 DIAGNOSIS — M25.672 DECREASED RANGE OF MOTION OF LEFT ANKLE: ICD-10-CM

## 2022-09-23 PROCEDURE — 97112 NEUROMUSCULAR REEDUCATION: CPT | Mod: PN,CQ

## 2022-09-23 PROCEDURE — 97110 THERAPEUTIC EXERCISES: CPT | Mod: PN,CQ

## 2022-09-23 NOTE — PROGRESS NOTES
[facility]   Physical Therapy Treatment Note     Name: Stephan Schwartz  Clinic Number: [id number]    Therapy Diagnosis:    Encounter Diagnoses   Name Primary?    Impaired functional mobility, balance, gait, and endurance Yes    Decreased range of motion of left ankle     Ankle weakness         Physician: Lexie Krueger PA-C    Visit Date: 9/23/2022    Medical Diagnosis: S86.012D (ICD-10-CM) - Rupture of left Achilles tendon, subsequent encounter   Evaluation Date: 8/23/2022   Surgery Date: 6/28/2022  Authorization Period Expiration: 12/31/2022  Plan of Care Certification Period: 10/18/2022  Visit # / Visits authorized: 4/20 +eval (5 visits total)   PTA Visit: 1/5    Precautions: standard    Time In: 8:00 pm   Time Out: 8:45 pm   Total Billable Time: 45 minutes    9/6/2022: 10 weeks post op     Rx/protocol and restrictions:   --Gait training, edema control/desensitization modalities  --NWB leftLE x4 weeks, immobilization: boot with heel lifts; may initiate boot weaning 10-12 weeks postop and transition remaining heel lifts into shoe   --AROM/AAROM and gentle strengthening/theraband work, but no PROM/manipulation until 3 months postop     --no eccentric exercises past neutral until 3 months postop        SUBJECTIVE     Pt reports: Patient reports he has been standing and walking without the boot without increased pain. He has been managing the swelling with ice.    He was compliant with home exercise program.   Response to previous treatment: Good response, able to perform HEP independently.   Functional change: Continues to ambulate in boot         Pain: 0/10  Location: left ankle     OBJECTIVE     Objective Measures updated at progress report unless specified.       TREATMENT     Stephan received the treatments listed in bold below:      Patient received therapeutic exercises for 15 minutes for improved strength and AROM including:  Ankle circles CW, CCW x30 each   Ankle alphabet upper and  lower x1   Toe spreading x30   Towel scrunches x3 minutes   Archersizer x30   Sidelying  AROM Inv/Ev x30     Ankle 3 way (no DF) vignesh band    Supine Ankle pumps x30  Toe yoga x30   Marbles 2x   Seated arch lifts x30  Clams 3x10  SL hip abd 3x10  Prone hip ext 3x10    Stephan participated in neuromuscular re-education activities to improve: Proprioception for 30 minutes. The following activities were included:  Lateral weight shifting to tolerance with 5 second holds on L 3x 1 minute   A/P weight shifting to tolerance onto L foot with 5 second holds 3x 1 minute   +NBOS on foam 3x30 sec   +Marches on foam 3x10   +Rebounder NBOS/L SLS   +Lat walks orange band around feet 2x20ft     PATIENT EDUCATION AND HOME EXERCISES     Home Exercises Provided and Patient Education Provided     Education provided:   PT educated pt on importance of compliance with their HEP this visit.     Written Home Exercises Provided: Patient instructed to cont prior HEP. Exercises were reviewed and Stephan was able to demonstrate them prior to the end of the session.  Stephan demonstrated good  understanding of the education provided. See EMR under Patient Instructions for exercises provided during therapy sessions    ASSESSMENT   Patient presented to clinic without boot today, denies pain with walking and standing. Mild swelling noted at L ankle. Patient demonstrated improved ankle mobility with eversion and inversion motions. Progressed weight bearing exercises today with emphasis on ankle stability. Continue to progress exercises as tolerated.       Stephan Is progressing well towards his goals.   Pt prognosis is Good.     Pt will continue to benefit from skilled outpatient physical therapy to address the deficits listed in the problem list box on initial evaluation, provide pt/family education and to maximize pt's level of independence in the home and community environment.     Pt's spiritual, cultural and educational needs considered and pt  agreeable to plan of care and goals.     Anticipated barriers to physical therapy: None    Goals:   Short Term Goals: 4 weeks   Independent with HEP.  Amb in community without walking boot.  Report decreased L ankle pain < or =  2/10 with adls such as standing, walking, and standing to shower.  Increased MMT for B LE by 1/2 muscle grade to promote proper pelvic stability to decrease   L ankle pain < or =  2/10 with adls such as standing, walking, and standing to shower.     Long Term Goals: 8 weeks   I community ambulation with normal gait pattern.  Up/down steps with I with alternating step pattern.  Increase L ankle dorsiflexion to 10 deg.  Report decreased L ankle pain < or =  1/10 with adls such as standing, walking, and standing to shower.  Increased MMT for B LE by 1 muscle grade to promote proper pelvic stability to decrease L ankle pain < or =  1/10 with adls such as standing, walking, and standing to shower.   Increased flexibility in B hamstrings to -20 deg with 90/90 test to promote proper pelvic stability to decrease L ankle pain < or =  1/10 with adls such as standing, walking, and standing to shower.   Increased flexibility in B hip adductors, B piriformis, B hip flexors, B IT bands, and B quads to promote proper pelvic stability to decrease L ankle pain < or =  1/10 with adls such as standing, walking, and standing to shower.  Patient to achieve CI (at least 1% but less than 20% impaired, limited or restricted) level at 11% functional limitation on the FOTO Outcomes Measurement System.      PLAN   Certification Period/Plan of care expiration: 8/23/2022 to 10/18/2022.     Outpatient Physical Therapy 2 times weekly for 8 weeks to include the following interventions: Gait Training, Manual Therapy, Moist Heat/ Ice, Neuromuscular Re-ed, Patient Education, Therapeutic Activities and Therapeutic Exercise.            Jimi Burgess, PTA

## 2022-09-30 ENCOUNTER — CLINICAL SUPPORT (OUTPATIENT)
Dept: REHABILITATION | Facility: OTHER | Age: 32
End: 2022-09-30
Payer: COMMERCIAL

## 2022-09-30 DIAGNOSIS — Z74.09 IMPAIRED FUNCTIONAL MOBILITY, BALANCE, GAIT, AND ENDURANCE: Primary | ICD-10-CM

## 2022-09-30 DIAGNOSIS — M25.672 DECREASED RANGE OF MOTION OF LEFT ANKLE: ICD-10-CM

## 2022-09-30 DIAGNOSIS — R29.898 ANKLE WEAKNESS: ICD-10-CM

## 2022-09-30 PROCEDURE — 97112 NEUROMUSCULAR REEDUCATION: CPT | Mod: PN,CQ

## 2022-09-30 PROCEDURE — 97110 THERAPEUTIC EXERCISES: CPT | Mod: PN,CQ

## 2022-09-30 NOTE — PROGRESS NOTES
"[facility]   Physical Therapy Treatment Note     Name: Stephan Schwartz  Clinic Number: [id number]    Therapy Diagnosis:    Encounter Diagnoses   Name Primary?    Impaired functional mobility, balance, gait, and endurance Yes    Decreased range of motion of left ankle     Ankle weakness           Physician: Lexie Krueger PA-C    Visit Date: 9/30/2022    Medical Diagnosis: S86.012D (ICD-10-CM) - Rupture of left Achilles tendon, subsequent encounter   Evaluation Date: 8/23/2022   Surgery Date: 6/28/2022  Authorization Period Expiration: 12/31/2022  Plan of Care Certification Period: 10/18/2022  Visit # / Visits authorized: 5/20 +eval (6 visits total)   PTA Visit: 2/5    Precautions: standard    Time In: 7:54 am  Time Out: 8:41 am  Total Billable Time: 47 minutes    9/6/2022: 10 weeks post op     Rx/protocol and restrictions:   --Gait training, edema control/desensitization modalities  --NWB leftLE x4 weeks, immobilization: boot with heel lifts; may initiate boot weaning 10-12 weeks postop and transition remaining heel lifts into shoe   --AROM/AAROM and gentle strengthening/theraband work, but no PROM/manipulation until 3 months postop     --no eccentric exercises past neutral until 3 months postop        SUBJECTIVE     Pt states "doing well this morning". Some pain in the back of the ankle, but it's mostly from where the shoe touches the back of the ankle. Tends to feel some stiffness first thing in the morning, but doesn't last long. Some swelling at the end of the day, but not as much as it was swelling. Pt reports walking around without boot for about a month.    He was compliant with home exercise program.   Response to previous treatment: "Everything felt fine"   Functional change: Ambulating outside of boot       Pain: 1/10  Location: left ankle    OBJECTIVE     8/23/2022:    AROM  LE MMT  R  L    Hip flexion  5/5  5/5    Hip abduction  5/5  5/5    Hip extension  5/5  5/5    Hip ER  " 5/5 5/5    Hip IR  5/5 5/5    Knee extension  5/5 5/5    Knee flexion  5/5 5/5    Ankle dorsiflexion  5/5  2-/5    Ankle plantar flexion  5/5  3/5 *   Ankle inversion  5/5  3/5 *   Ankle eversion  5/5  3/5 *      * - no resistance given     Flexibility testing:  - hamstrings:         B: tight, R: -28 deg, L: -31 deg  - gastrocnemius:   B: tight, R: neutral, L: 10 deg from neutral  - piriformis:             B: tight, decreased 25%  - quadriceps:          B: WFL  - hip adductors:      B: tight, decreased 25%  - hip flexors:           B: WNL  - IT bands:              B: tight     Joint mobility:                         R                           L  Ankle dorsiflexion              Neutral                  10 deg  Ankle plantar flexion           55 deg                  20 deg  Ankle Inversion                   50 deg                  35 deg  Ankle eversion                    25 deg                  15        CMS Impairment/Limitation/Restriction for FOTO Ankle Survey     Therapist reviewed FOTO scores for Stephan Schwartz on 8/23/2022.   FOTO documents entered into [employer] - see Media section.     Limitation Score: 38%  Category: Mobility     Current : CJ = at least 20% but < 40% impaired, limited or restricted  Goal: CI = at least 1% but < 20% impaired, limited or restricted           TREATMENT     Stephan received the treatments listed in bold below:      Patient received therapeutic exercises for 10 minutes for improved strength and AROM including:    Ankle circles CW, CCW x30 each   Ankle alphabet upper and lower x1   Toe spreading x30   Towel scrunches x3 minutes   Archersizer x30   Sidelying  AROM Inv/Ev x30     Ankle 3 way (no DF) vignesh band    Supine Ankle pumps x30  Toe yoga x30   Marbles 2x   Seated arch lifts x30  Clams 3x10  SL hip abd 3x10  Prone hip ext 3x10    Stephan participated in neuromuscular re-education activities to improve: Proprioception for 37 minutes.     Lateral weight shifting to tolerance with  "5 second holds on L 3x 1 minute   A/P weight shifting to tolerance onto L foot with 5 second holds 3x 1 minute   NBOS on foam 3x30 sec   +Tandem stance on foam 2 x 30"  Marches on foam 3x10   Rebounder NBOS/L SLS   Lat walks OTB around feet 2 x 20ft   +Mini squats 2 x 15  +Standing heel raises  +6" step ups  +SLS 3 x 30"  +SLS on foam 3 x 30"    PATIENT EDUCATION AND HOME EXERCISES     Home Exercises Provided and Patient Education Provided     Education provided:   PT educated pt on importance of compliance with their HEP this visit.     Written Home Exercises Provided: Patient instructed to cont prior HEP. Exercises were reviewed and Stephan was able to demonstrate them prior to the end of the session.  Stephan demonstrated good  understanding of the education provided. See EMR under Patient Instructions for exercises provided during therapy sessions    ASSESSMENT   Tightness in back of L ankle with squats and heel raises, but not painful. Pt stated " I know I wouldn't be able to do it just on my left leg", when performing heel raises. Pt tolerated exercise progressions in standing and on foam well. Pt felt more comfortable performing standing exercises with shoes on.    Stephan Is progressing well towards his goals.   Pt prognosis is Good.     Pt will continue to benefit from skilled outpatient physical therapy to address the deficits listed in the problem list box on initial evaluation, provide pt/family education and to maximize pt's level of independence in the home and community environment.     Pt's spiritual, cultural and educational needs considered and pt agreeable to plan of care and goals.     Anticipated barriers to physical therapy: None    Goals:   Short Term Goals: 4 weeks   Independent with HEP. (Not Met)  Amb in community without walking boot. (Met)  Report decreased L ankle pain < or =  2/10 with adls such as standing, walking, and standing to shower. (Not Met)  Increased MMT for B LE by 1/2 " muscle grade to promote proper pelvic stability to decrease   L ankle pain < or =  2/10 with adls such as standing, walking, and standing to shower. (Not Met)     Long Term Goals: 8 weeks   I community ambulation with normal gait pattern. (Not Met)  Up/down steps with I with alternating step pattern. (Not Met)  Increase L ankle dorsiflexion to 10 deg. (Not Met)  Report decreased L ankle pain < or =  1/10 with adls such as standing, walking, and standing to shower. (Not Met)  Increased MMT for B LE by 1 muscle grade to promote proper pelvic stability to decrease L ankle pain < or =  1/10 with adls such as standing, walking, and standing to shower. (Not Met)  Increased flexibility in B hamstrings to -20 deg with 90/90 test to promote proper pelvic stability to decrease L ankle pain < or =  1/10 with adls such as standing, walking, and standing to shower. (Not Met)  Increased flexibility in B hip adductors, B piriformis, B hip flexors, B IT bands, and B quads to promote proper pelvic stability to decrease L ankle pain < or =  1/10 with adls such as standing, walking, and standing to shower. (Not Met)  Patient to achieve CI (at least 1% but less than 20% impaired, limited or restricted) level at 11% functional limitation on the FOTO Outcomes Measurement System. (Not Met)      PLAN   Certification Period/Plan of care expiration: 8/23/2022 to 10/18/2022.     Continue to increase ankle strength, stability, and mobility.    Outpatient Physical Therapy 2 times weekly for 8 weeks to include the following interventions: Gait Training, Manual Therapy, Moist Heat/ Ice, Neuromuscular Re-ed, Patient Education, Therapeutic Activities and Therapeutic Exercise.        Mariola Martino III, PTA

## 2022-12-15 ENCOUNTER — OFFICE VISIT (OUTPATIENT)
Dept: ORTHOPEDICS | Facility: CLINIC | Age: 32
End: 2022-12-15
Payer: COMMERCIAL

## 2022-12-15 VITALS — BODY MASS INDEX: 27.82 KG/M2 | HEIGHT: 70 IN | WEIGHT: 194.31 LBS

## 2022-12-15 DIAGNOSIS — Z98.890 H/O ACHILLES TENDON REPAIR: Primary | ICD-10-CM

## 2022-12-15 PROCEDURE — 99213 OFFICE O/P EST LOW 20 MIN: CPT | Mod: S$GLB,,, | Performed by: ORTHOPAEDIC SURGERY

## 2022-12-15 PROCEDURE — 3044F HG A1C LEVEL LT 7.0%: CPT | Mod: CPTII,S$GLB,, | Performed by: ORTHOPAEDIC SURGERY

## 2022-12-15 PROCEDURE — 99999 PR PBB SHADOW E&M-EST. PATIENT-LVL III: ICD-10-PCS | Mod: PBBFAC,,, | Performed by: ORTHOPAEDIC SURGERY

## 2022-12-15 PROCEDURE — 99213 PR OFFICE/OUTPT VISIT, EST, LEVL III, 20-29 MIN: ICD-10-PCS | Mod: S$GLB,,, | Performed by: ORTHOPAEDIC SURGERY

## 2022-12-15 PROCEDURE — 3008F BODY MASS INDEX DOCD: CPT | Mod: CPTII,S$GLB,, | Performed by: ORTHOPAEDIC SURGERY

## 2022-12-15 PROCEDURE — 3044F PR MOST RECENT HEMOGLOBIN A1C LEVEL <7.0%: ICD-10-PCS | Mod: CPTII,S$GLB,, | Performed by: ORTHOPAEDIC SURGERY

## 2022-12-15 PROCEDURE — 99999 PR PBB SHADOW E&M-EST. PATIENT-LVL III: CPT | Mod: PBBFAC,,, | Performed by: ORTHOPAEDIC SURGERY

## 2022-12-15 PROCEDURE — 1159F MED LIST DOCD IN RCRD: CPT | Mod: CPTII,S$GLB,, | Performed by: ORTHOPAEDIC SURGERY

## 2022-12-15 PROCEDURE — 1159F PR MEDICATION LIST DOCUMENTED IN MEDICAL RECORD: ICD-10-PCS | Mod: CPTII,S$GLB,, | Performed by: ORTHOPAEDIC SURGERY

## 2022-12-15 PROCEDURE — 3008F PR BODY MASS INDEX (BMI) DOCUMENTED: ICD-10-PCS | Mod: CPTII,S$GLB,, | Performed by: ORTHOPAEDIC SURGERY

## 2022-12-15 NOTE — LETTER
December 15, 2022        Kina Tapia, PT             [facility] [address]  [address], [address]  [facility] [address]  Phone: [phone]   Patient: Stephan Schwartz   MR Number: [id number]   YOB: 1990   Date of Visit: 12/15/2022     Dear Dr. Tapia,     Sending this patient to you in the new year.  He is 6 months out from achilles repair and wants guidance with return to running/sport.  He had some frustration with the constantly changing PTAs that saw him with his initial postop PT.      Sincerely,      Allyson Walker MD            CC  No Recipients    Enclosure

## 2023-05-01 DIAGNOSIS — Z00.00 ROUTINE GENERAL MEDICAL EXAMINATION AT A HEALTH CARE FACILITY: Primary | ICD-10-CM

## 2023-05-18 ENCOUNTER — CLINICAL SUPPORT (OUTPATIENT)
Dept: INTERNAL MEDICINE | Facility: CLINIC | Age: 33
End: 2023-05-18

## 2023-05-18 ENCOUNTER — HOSPITAL ENCOUNTER (OUTPATIENT)
Dept: CARDIOLOGY | Facility: CLINIC | Age: 33
Discharge: HOME | End: 2023-05-18

## 2023-05-18 ENCOUNTER — OFFICE VISIT (OUTPATIENT)
Dept: INTERNAL MEDICINE | Facility: CLINIC | Age: 33
End: 2023-05-18

## 2023-05-18 VITALS
WEIGHT: 197.81 LBS | SYSTOLIC BLOOD PRESSURE: 130 MMHG | BODY MASS INDEX: 28.32 KG/M2 | HEART RATE: 69 BPM | DIASTOLIC BLOOD PRESSURE: 87 MMHG | HEIGHT: 70 IN

## 2023-05-18 DIAGNOSIS — Z00.00 ROUTINE GENERAL MEDICAL EXAMINATION AT A HEALTH CARE FACILITY: Primary | ICD-10-CM

## 2023-05-18 DIAGNOSIS — Z00.00 ROUTINE GENERAL MEDICAL EXAMINATION AT A HEALTH CARE FACILITY: ICD-10-CM

## 2023-05-18 DIAGNOSIS — E78.00 HYPERCHOLESTEROLEMIA: ICD-10-CM

## 2023-05-18 DIAGNOSIS — Z00.00 HEALTHCARE MAINTENANCE: ICD-10-CM

## 2023-05-18 PROBLEM — M25.672 DECREASED RANGE OF MOTION OF LEFT ANKLE: Status: RESOLVED | Noted: 2022-08-23 | Resolved: 2023-05-18

## 2023-05-18 PROBLEM — R29.898 ANKLE WEAKNESS: Status: RESOLVED | Noted: 2022-08-23 | Resolved: 2023-05-18

## 2023-05-18 PROBLEM — Z74.09 IMPAIRED FUNCTIONAL MOBILITY, BALANCE, GAIT, AND ENDURANCE: Status: RESOLVED | Noted: 2022-08-23 | Resolved: 2023-05-18

## 2023-05-18 LAB
ALBUMIN SERPL BCP-MCNC: 4.5 G/DL (ref 3.5–5.2)
ALP SERPL-CCNC: 73 U/L (ref 55–135)
ALT SERPL W/O P-5'-P-CCNC: 40 U/L (ref 10–44)
ANION GAP SERPL CALC-SCNC: 11 MMOL/L (ref 8–16)
AST SERPL-CCNC: 26 U/L (ref 10–40)
BILIRUB SERPL-MCNC: 1 MG/DL (ref 0.1–1)
BUN SERPL-MCNC: 14 MG/DL (ref 6–20)
CALCIUM SERPL-MCNC: 10.1 MG/DL (ref 8.7–10.5)
CHLORIDE SERPL-SCNC: 102 MMOL/L (ref 95–110)
CHOLEST SERPL-MCNC: 272 MG/DL (ref 120–199)
CHOLEST/HDLC SERPL: 4 {RATIO} (ref 2–5)
CO2 SERPL-SCNC: 28 MMOL/L (ref 23–29)
CREAT SERPL-MCNC: 1 MG/DL (ref 0.5–1.4)
ERYTHROCYTE [DISTWIDTH] IN BLOOD BY AUTOMATED COUNT: 12.4 % (ref 11.5–14.5)
EST. GFR  (NO RACE VARIABLE): >60 ML/MIN/1.73 M^2
ESTIMATED AVG GLUCOSE: 97 MG/DL (ref 68–131)
GLUCOSE SERPL-MCNC: 102 MG/DL (ref 70–110)
HBA1C MFR BLD: 5 % (ref 4–5.6)
HCT VFR BLD AUTO: 45 % (ref 40–54)
HDLC SERPL-MCNC: 68 MG/DL (ref 40–75)
HDLC SERPL: 25 % (ref 20–50)
HGB BLD-MCNC: 16 G/DL (ref 14–18)
LDLC SERPL CALC-MCNC: 183.6 MG/DL (ref 63–159)
MCH RBC QN AUTO: 31.3 PG (ref 27–31)
MCHC RBC AUTO-ENTMCNC: 35.6 G/DL (ref 32–36)
MCV RBC AUTO: 88 FL (ref 82–98)
NONHDLC SERPL-MCNC: 204 MG/DL
PLATELET # BLD AUTO: 239 K/UL (ref 150–450)
PMV BLD AUTO: 11.2 FL (ref 9.2–12.9)
POTASSIUM SERPL-SCNC: 4.1 MMOL/L (ref 3.5–5.1)
PROT SERPL-MCNC: 7.7 G/DL (ref 6–8.4)
RBC # BLD AUTO: 5.11 M/UL (ref 4.6–6.2)
SODIUM SERPL-SCNC: 141 MMOL/L (ref 136–145)
TRIGL SERPL-MCNC: 102 MG/DL (ref 30–150)
TSH SERPL DL<=0.005 MIU/L-ACNC: 1.66 UIU/ML (ref 0.4–4)
WBC # BLD AUTO: 6.25 K/UL (ref 3.9–12.7)

## 2023-05-18 PROCEDURE — 93005 EKG 12-LEAD: ICD-10-PCS | Mod: S$GLB,,, | Performed by: STUDENT IN AN ORGANIZED HEALTH CARE EDUCATION/TRAINING PROGRAM

## 2023-05-18 PROCEDURE — 80053 COMPREHEN METABOLIC PANEL: CPT | Performed by: STUDENT IN AN ORGANIZED HEALTH CARE EDUCATION/TRAINING PROGRAM

## 2023-05-18 PROCEDURE — 99385 PR PREVENTIVE VISIT,NEW,18-39: ICD-10-PCS | Mod: S$GLB,,, | Performed by: STUDENT IN AN ORGANIZED HEALTH CARE EDUCATION/TRAINING PROGRAM

## 2023-05-18 PROCEDURE — 99999 PR PBB SHADOW E&M-EST. PATIENT-LVL III: CPT | Mod: PBBFAC,,, | Performed by: STUDENT IN AN ORGANIZED HEALTH CARE EDUCATION/TRAINING PROGRAM

## 2023-05-18 PROCEDURE — 93005 ELECTROCARDIOGRAM TRACING: CPT | Mod: S$GLB,,, | Performed by: STUDENT IN AN ORGANIZED HEALTH CARE EDUCATION/TRAINING PROGRAM

## 2023-05-18 PROCEDURE — 99999 PR PBB SHADOW E&M-EST. PATIENT-LVL III: ICD-10-PCS | Mod: PBBFAC,,, | Performed by: STUDENT IN AN ORGANIZED HEALTH CARE EDUCATION/TRAINING PROGRAM

## 2023-05-18 PROCEDURE — 36415 COLL VENOUS BLD VENIPUNCTURE: CPT | Performed by: STUDENT IN AN ORGANIZED HEALTH CARE EDUCATION/TRAINING PROGRAM

## 2023-05-18 PROCEDURE — 83036 HEMOGLOBIN GLYCOSYLATED A1C: CPT | Performed by: STUDENT IN AN ORGANIZED HEALTH CARE EDUCATION/TRAINING PROGRAM

## 2023-05-18 PROCEDURE — 93010 ELECTROCARDIOGRAM REPORT: CPT | Mod: S$GLB,,, | Performed by: INTERNAL MEDICINE

## 2023-05-18 PROCEDURE — 99385 PREV VISIT NEW AGE 18-39: CPT | Mod: S$GLB,,, | Performed by: STUDENT IN AN ORGANIZED HEALTH CARE EDUCATION/TRAINING PROGRAM

## 2023-05-18 PROCEDURE — 84443 ASSAY THYROID STIM HORMONE: CPT | Performed by: STUDENT IN AN ORGANIZED HEALTH CARE EDUCATION/TRAINING PROGRAM

## 2023-05-18 PROCEDURE — 80061 LIPID PANEL: CPT | Performed by: STUDENT IN AN ORGANIZED HEALTH CARE EDUCATION/TRAINING PROGRAM

## 2023-05-18 PROCEDURE — 85027 COMPLETE CBC AUTOMATED: CPT | Performed by: STUDENT IN AN ORGANIZED HEALTH CARE EDUCATION/TRAINING PROGRAM

## 2023-05-18 PROCEDURE — 93010 EKG 12-LEAD: ICD-10-PCS | Mod: S$GLB,,, | Performed by: INTERNAL MEDICINE

## 2023-05-18 NOTE — ASSESSMENT & PLAN NOTE
Health care maintenance and core gaps reviewed and assessed with patient.  Vaccinations recommended:        Tetanus - 2020       Shingles - N/A       Influenza - N/A        Pneumonia - N/A  Colon cancer screening:   Colonoscopy: N/A  Lifestyle recommendations given.  Physical activity recommended.    Legend: Ordered (O), Declined (D), Current (C)

## 2023-05-18 NOTE — PROGRESS NOTES
"Subjective:       Patient ID: Stephan Schwartz is a 32 y.o. male.    Chief Complaint: Executive Health (Lleg injury 11 months ago & taking long to recup.)    HPI    Stephan Schwartz is a 32 y.o. male , English speaking, with a history of:  Hypercholesterolemia    Patient comes to the clinic a general medical health examination    Patient is currently asymptomatic and has no complaints.  Patient denies CV symptoms, CP, SOB, palpitations.  Patient denies constitutional symptoms, fever, changes in the urine or stool.    Today's labs:  CBC WNL  CMP WNL  Lipid panel: , HDL 68, LDL 25,   A1c: 5.0  TSH WNL 1.655  UA WNL  ECG NSR    PMH:  Past Medical History:   Diagnosis Date    Hypercholesterolemia     Multiple nevi        PSH:  Past Surgical History:   Procedure Laterality Date    REPAIR OF ACHILLES TENDON Left 6/28/2022    Procedure: REPAIR, TENDON, ACHILLES;  Surgeon: Allyson Walker MD;  Location: [facility];  Service: Orthopedics;  Laterality: Left;       FH:  Family History   Problem Relation Age of Onset    No Known Problems Mother     Cancer Maternal Grandmother         breast    Diabetes Paternal Grandmother     Heart disease Paternal Grandmother     Colon cancer Neg Hx        Allergies: Negative  Review of patient's allergies indicates:  No Known Allergies    Meds:  No current outpatient medications on file.    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 Month: May    ROS  11-point review of systems done. Negative except for detailed in the HPI.        Objective:      Vitals:    05/18/23 0931   BP: 130/87   Pulse: 69   Weight: 89.7 kg (197 lb 12.8 oz)   Height: 5' 10" (1.778 m) "         Physical Exam  Vitals and nursing note reviewed.   Constitutional:       Appearance: Normal appearance.   HENT:      Head: Normocephalic and atraumatic.      Right Ear: Tympanic membrane normal.      Left Ear: Tympanic membrane normal.      Nose: Nose normal.      Mouth/Throat:      Mouth: Mucous membranes are moist.      Pharynx: Oropharynx is clear.   Eyes:      Extraocular Movements: Extraocular movements intact.      Conjunctiva/sclera: Conjunctivae normal.      Pupils: Pupils are equal, round, and reactive to light.   Cardiovascular:      Rate and Rhythm: Normal rate and regular rhythm.      Pulses: Normal pulses.      Heart sounds: Normal heart sounds.   Pulmonary:      Effort: Pulmonary effort is normal.      Breath sounds: Normal breath sounds.   Abdominal:      General: Bowel sounds are normal. There is no distension.      Palpations: Abdomen is soft.      Tenderness: There is no abdominal tenderness.   Musculoskeletal:         General: Normal range of motion.      Cervical back: Normal range of motion and neck supple.   Skin:     General: Skin is warm.   Neurological:      General: No focal deficit present.      Mental Status: He is alert and oriented to person, place, and time. Mental status is at baseline.   Psychiatric:         Mood and Affect: Mood normal.          Assessment:       1. Routine general medical examination at a health care facility  Assessment & Plan:  Patient is in overall good general health.  Stable medical conditions listed on the PMH.  Labs reviewed and patient notified.        2. Hypercholesterolemia  Assessment & Plan:  Lab Results   Component Value Date    CHOL 272 (H) 05/18/2023    CHOL 218 (H) 07/15/2022    CHOL 226 (H) 08/19/2021     Lab Results   Component Value Date    HDL 68 05/18/2023    HDL 52 07/15/2022    HDL 59 08/19/2021     Lab Results   Component Value Date    LDLCALC 183.6 (H) 05/18/2023    LDLCALC 151.2 07/15/2022    LDLCALC 146.6 08/19/2021     No results  found for: DLDL  Lab Results   Component Value Date    TRIG 102 05/18/2023    TRIG 74 07/15/2022    TRIG 102 08/19/2021       f1   Lab Results   Component Value Date    CHOLHDL 25.0 05/18/2023    CHOLHDL 23.9 07/15/2022    CHOLHDL 26.1 08/19/2021     Up trending LDL  Recommended to decrease meats and food rich in cholesterol  Continue physical activity        3. Healthcare maintenance  Assessment & Plan:  Health care maintenance and core gaps reviewed and assessed with patient.  Vaccinations recommended:        Tetanus - 2020       Shingles - N/A       Influenza - N/A        Pneumonia - N/A  Colon cancer screening:   Colonoscopy: N/A  Lifestyle recommendations given.  Physical activity recommended.    Legend: Ordered (O), Declined (D), Current (C)           Plan:       -Diet recommended / low fat / low cholesterol      Recommendations for patient:  -Diet recommended / low fat / low cholesterol  - Continue physical activity      Education provided  Lifestyle recommendations given  AVS generated, explained, and sent to the patient through the patient portal.  RTC in : 1 year for annual wellness visit, labs ordered          GLENIS CONNELL MD, MPH  Internal Medicine  [facility]  [facility]

## 2023-05-18 NOTE — ASSESSMENT & PLAN NOTE
Lab Results   Component Value Date    CHOL 272 (H) 05/18/2023    CHOL 218 (H) 07/15/2022    CHOL 226 (H) 08/19/2021     Lab Results   Component Value Date    HDL 68 05/18/2023    HDL 52 07/15/2022    HDL 59 08/19/2021     Lab Results   Component Value Date    LDLCALC 183.6 (H) 05/18/2023    LDLCALC 151.2 07/15/2022    LDLCALC 146.6 08/19/2021     No results found for: DLDL  Lab Results   Component Value Date    TRIG 102 05/18/2023    TRIG 74 07/15/2022    TRIG 102 08/19/2021       f1   Lab Results   Component Value Date    CHOLHDL 25.0 05/18/2023    CHOLHDL 23.9 07/15/2022    CHOLHDL 26.1 08/19/2021     Up trending LDL  Recommended to decrease meats and food rich in cholesterol  Continue physical activity

## 2023-08-21 PROBLEM — Z00.00 HEALTHCARE MAINTENANCE: Status: RESOLVED | Noted: 2023-05-18 | Resolved: 2023-08-21

## 2023-08-21 PROBLEM — Z00.00 ROUTINE GENERAL MEDICAL EXAMINATION AT A HEALTH CARE FACILITY: Status: RESOLVED | Noted: 2023-05-18 | Resolved: 2023-08-21

## 2023-10-07 ENCOUNTER — OFFICE VISIT (OUTPATIENT)
Dept: URGENT CARE | Facility: CLINIC | Age: 33
End: 2023-10-07
Payer: COMMERCIAL

## 2023-10-07 VITALS
RESPIRATION RATE: 18 BRPM | HEIGHT: 70 IN | HEART RATE: 91 BPM | BODY MASS INDEX: 28.31 KG/M2 | TEMPERATURE: 99 F | OXYGEN SATURATION: 95 % | DIASTOLIC BLOOD PRESSURE: 79 MMHG | WEIGHT: 197.75 LBS | SYSTOLIC BLOOD PRESSURE: 128 MMHG

## 2023-10-07 DIAGNOSIS — S61.011A LACERATION OF RIGHT THUMB WITHOUT FOREIGN BODY WITHOUT DAMAGE TO NAIL, INITIAL ENCOUNTER: Primary | ICD-10-CM

## 2023-10-07 PROCEDURE — 99214 PR OFFICE/OUTPT VISIT, EST, LEVL IV, 30-39 MIN: ICD-10-PCS | Mod: 25,S$GLB,, | Performed by: NURSE PRACTITIONER

## 2023-10-07 PROCEDURE — 12001 LACERATION REPAIR: ICD-10-PCS | Mod: S$GLB,,, | Performed by: NURSE PRACTITIONER

## 2023-10-07 PROCEDURE — 99214 OFFICE O/P EST MOD 30 MIN: CPT | Mod: 25,S$GLB,, | Performed by: NURSE PRACTITIONER

## 2023-10-07 PROCEDURE — 12001 RPR S/N/AX/GEN/TRNK 2.5CM/<: CPT | Mod: S$GLB,,, | Performed by: NURSE PRACTITIONER

## 2023-10-07 RX ORDER — CEPHALEXIN 500 MG/1
500 CAPSULE ORAL EVERY 12 HOURS
Qty: 14 CAPSULE | Refills: 0 | Status: SHIPPED | OUTPATIENT
Start: 2023-10-07 | End: 2023-10-14

## 2023-10-07 RX ORDER — MUPIROCIN 20 MG/G
OINTMENT TOPICAL
Qty: 22 G | Refills: 1 | Status: SHIPPED | OUTPATIENT
Start: 2023-10-09 | End: 2023-10-16

## 2023-10-07 NOTE — PROGRESS NOTES
"Subjective:      Patient ID: Stephan Schwartz is a 32 y.o. male.    Vitals:  height is 5' 10" (1.778 m) and weight is 89.7 kg (197 lb 12 oz). His tympanic temperature is 98.5 °F (36.9 °C). His blood pressure is 128/79 and his pulse is 91. His respiration is 18 and oxygen saturation is 95%.     Chief Complaint: Laceration    Patient presents today with a laceration to the right hand between the thumb and inde. Patient states that he was cleaning dishes when it happened.   Provider note begins below    Patient cut the webbing between his thumb and index finger on glass when doing the dishes.  His last tetanus shot was in 2020.    Laceration   The incident occurred less than 1 hour ago. The laceration is located on the Right hand. The laceration is 4 cm in size. The laceration mechanism was a broken glass. The pain is at a severity of 0/10. The patient is experiencing no pain.       Constitution: Negative for sweating and fatigue.   Cardiovascular:  Negative for chest pain.   Respiratory:  Negative for cough.    Gastrointestinal:  Negative for nausea, vomiting, constipation and diarrhea.   Musculoskeletal:  Positive for pain (mild) and trauma.   Skin:  Positive for laceration. Negative for erythema.      Objective:     Physical Exam   Constitutional: He is oriented to person, place, and time.   HENT:   Head: Normocephalic and atraumatic.   Cardiovascular: Normal rate.   Pulmonary/Chest: Effort normal. No respiratory distress.   Abdominal: Normal appearance.   Musculoskeletal:        Hands:    Neurological: He is alert and oriented to person, place, and time.   Skin: Skin is dry. No erythema   Psychiatric: His behavior is normal. Mood normal.             Assessment:     1. Laceration of right thumb without foreign body without damage to nail, initial encounter        Plan:   Sutures placed   Keep pressure dressing dry and intact for 2 days   May remove pressure dressing at that time and wash with soap and water.  May keep " open to air unless risk of contamination.  Return to clinic in 7-10 days to have sutures removed.           Laceration of right thumb without foreign body without damage to nail, initial encounter  -     Laceration Repair  -     mupirocin (BACTROBAN) 2 % ointment; Apply to affected area 3 times daily  Dispense: 22 g; Refill: 1  -     cephALEXin (KEFLEX) 500 MG capsule; Take 1 capsule (500 mg total) by mouth every 12 (twelve) hours. for 7 days  Dispense: 14 capsule; Refill: 0

## 2023-10-07 NOTE — PROCEDURES
Laceration Repair    Date/Time: 10/7/2023 10:45 AM    Performed by: Clara Sepulveda NP  Authorized by: Clara Sepulveda NP  Consent Done: Yes  Consent: Verbal consent obtained.  Risks and benefits: risks, benefits and alternatives were discussed  Consent given by: patient  Patient identity confirmed: , name and verbally with patient  Body area: upper extremity  Location details: right hand  Laceration length: 1 cm  Foreign bodies: no foreign bodies  Tendon involvement: none  Nerve involvement: none  Vascular damage: no  Anesthesia: local infiltration    Anesthesia:  Local Anesthetic: lidocaine 1% without epinephrine and LET (lido,epi,tetracaine)  Anesthetic total: 0.25 mL    Patient sedated: no  Preparation: Patient was prepped and draped in the usual sterile fashion.  Irrigation solution: saline  Irrigation method: syringe  Amount of cleaning: standard  Debridement: none  Degree of undermining: none  Skin closure: 5-0 Prolene  Number of sutures: 3  Technique: simple  Approximation: close  Approximation difficulty: simple  Dressing: non-stick sterile dressing, pressure/compression dressing and antibiotic ointment  Patient tolerance: Patient tolerated the procedure well with no immediate complications

## 2023-10-07 NOTE — PATIENT INSTRUCTIONS
Sutures placed   Keep pressure dressing intact for 2 days   May remove pressure dressing at that time and wash with soap and water.  May keep open to air unless risk of contamination.  Return to clinic in 7-10 days to have sutures removed.

## 2023-12-14 ENCOUNTER — PATIENT MESSAGE (OUTPATIENT)
Dept: INTERNAL MEDICINE | Facility: CLINIC | Age: 33
End: 2023-12-14
Payer: COMMERCIAL

## 2024-01-04 DIAGNOSIS — Z00.00 ROUTINE MEDICAL EXAM: Primary | ICD-10-CM

## 2024-08-15 NOTE — PROGRESS NOTES
[facility] EXECUTIVE HEALTH EXAM      CHIEF COMPLAINT: Executive health exam      HISTORY OF PRESENT ILLNESS: Stephan Schwartz is a 33 y.o. male who presents here today for an executive health examination. Patient is an employee of [employer]. Patient denies any acute concerns today.      PAST MEDICAL HISTORY:  Past Medical History:   Diagnosis Date    Moderate mixed hyperlipidemia not requiring statin therapy     Multiple nevi        MEDICATIONS:  No current outpatient medications on file.      PAST SURGICAL HISTORY:  Past Surgical History:   Procedure Laterality Date    REPAIR OF ACHILLES TENDON Left 6/28/2022    Procedure: REPAIR, TENDON, ACHILLES;  Surgeon: Allyson Walker MD;  Location: [facility];  Service: Orthopedics;  Laterality: Left;       SOCIAL HISTORY:  Social History     Tobacco Use    Smoking status: Never    Smokeless tobacco: Never   Substance Use Topics    Alcohol use: Yes     Alcohol/week: 10.0 standard drinks of alcohol     Types: 10 Glasses of wine per week    Drug use: Never       ALLERGIES:  Review of patient's allergies indicates:  No Known Allergies      FAMILY HISTORY:  Family History   Problem Relation Name Age of Onset    No Known Problems Mother      Cancer Maternal Grandmother          breast    Diabetes Paternal Grandmother      Heart disease Paternal Grandmother      Colon cancer Neg Hx           HEALTH MAINTENANCE:     IMMUNIZATIONS:                                                              Influenza: not indicated  COVID: yes, updated discussed  RSV: not indicated  Tdap: last in 2020, updated due 2030  HPV: not indicated  Shingles: not indicated  Pneumonia: not indicated    SCREENINGS        Prostate cancer: not indicated  Colon cancer: not indicated  Lung cancer: not indicated  HIV:  completed  Hepatitis C:  completed  STI: not indicated  Diabetes: ordered  Lipids: ordered  AAA: not indicated  Bone density: not indicated  Anxiety and depression:  "denies    LIFESTYLE         Exercise: yes, 6 x week  Diet: variable  Substance use: as above  Employment: [employer]  Family & living situation: lives in [place]    INTERVENTIONS        Statin: not indicated  Aspirin: not indicated        PHYSICAL EXAM:    BP (!) 144/100 (BP Location: Left arm, Patient Position: Sitting, BP Method: Medium (Manual))   Pulse 66   Ht 5' 10" (1.778 m)   Wt 88.9 kg (195 lb 15.8 oz)   SpO2 98%   BMI 28.12 kg/m²     Physical Exam  Vitals and nursing note reviewed.   Constitutional:       General: He is not in acute distress.     Appearance: Normal appearance. He is not ill-appearing, toxic-appearing or diaphoretic.   HENT:      Head: Normocephalic and atraumatic.      Nose: Nose normal.   Eyes:      Extraocular Movements: Extraocular movements intact.      Conjunctiva/sclera: Conjunctivae normal.      Pupils: Pupils are equal, round, and reactive to light.   Cardiovascular:      Rate and Rhythm: Normal rate.   Pulmonary:      Effort: Pulmonary effort is normal. No respiratory distress.   Musculoskeletal:         General: No deformity. Normal range of motion.   Skin:     Findings: No lesion or rash.   Neurological:      General: No focal deficit present.      Mental Status: He is alert.      Motor: No weakness.      Gait: Gait normal.   Psychiatric:         Mood and Affect: Mood normal.         Behavior: Behavior normal.         Thought Content: Thought content normal.         Judgment: Judgment normal.              LAB REVIEW:    Lab Results   Component Value Date     08/16/2024    K 4.8 08/16/2024     08/16/2024    CO2 30 (H) 08/16/2024    BUN 13 08/16/2024    CREATININE 0.8 08/16/2024    CALCIUM 10.2 08/16/2024    ANIONGAP 5 (L) 08/16/2024    EGFRNORACEVR >60.0 08/16/2024       Lab Results   Component Value Date    ALT 73 (H) 08/16/2024    AST 40 08/16/2024    ALKPHOS 79 08/16/2024    BILITOT 0.7 08/16/2024       Lab Results   Component Value Date    WBC 7.22 08/16/2024 "    HGB 15.7 08/16/2024    HCT 45.2 08/16/2024    MCV 89 08/16/2024     08/16/2024       Lab Results   Component Value Date    TSH 1.984 08/16/2024       Lab Results   Component Value Date    HGBA1C 5.1 08/16/2024       Cholesterol   Date Value Ref Range Status   08/16/2024 254 (H) 120 - 199 mg/dL Final     Comment:     The National Cholesterol Education Program (NCEP) has set the  following guidelines (reference ranges) for Cholesterol:  Optimal.....................<200 mg/dL  Borderline High.............200-239 mg/dL  High........................> or = 240 mg/dL       HDL   Date Value Ref Range Status   08/16/2024 65 40 - 75 mg/dL Final     Comment:     The National Cholesterol Education Program (NCEP) has set the  following guidelines (reference values) for HDL Cholesterol:  Low...............<40 mg/dL  Optimal...........>60 mg/dL       LDL Cholesterol   Date Value Ref Range Status   08/16/2024 171.6 (H) 63.0 - 159.0 mg/dL Final     Comment:     The National Cholesterol Education Program (NCEP) has set the  following guidelines (reference values) for LDL Cholesterol:  Optimal.......................<130 mg/dL  Borderline High...............130-159 mg/dL  High..........................160-189 mg/dL  Very High.....................>190 mg/dL       Triglycerides   Date Value Ref Range Status   08/16/2024 87 30 - 150 mg/dL Final     Comment:     The National Cholesterol Education Program (NCEP) has set the  following guidelines (reference values) for triglycerides:  Normal......................<150 mg/dL  Borderline High.............150-199 mg/dL  High........................200-499 mg/dL             IMAGING:    EKG    Results for orders placed or performed during the hospital encounter of 08/16/24   EKG 12-lead    Collection Time: 08/16/24  9:48 AM   Result Value Ref Range    QRS Duration 98 ms    [facility] QTC Calculation 399 ms    Narrative    Test Reason : Z00.00,    Vent. Rate : 065 BPM     Atrial Rate : 065 BPM      P-R Int : 132 ms          QRS Dur : 098 ms      QT Int : 384 ms       P-R-T Axes : 001 006 017 degrees     QTc Int : 399 ms    Normal sinus rhythm with sinus arrhythmia  Normal ECG  When compared with ECG of 18-MAY-2023 09:06,  No significant change was found  Confirmed by Yimi CROWDER MD ([id number]) on 8/16/2024 4:56:09 PM    Referred By: GLENIS CONNELL           Confirmed By:Yimi CROWDER MD           ASSESSMENT & PLAN:    1. Annual physical exam  2. Health maintenance examination  Health screenings and immunizations due as below  History, physical exam findings, imaging results, and lab results are all acceptable with exception of what is detailed below      3. Moderate mixed hyperlipidemia not requiring statin therapy  Assessment & Plan:  Lipid panel slightly improved from prior - cholesterol 272 > 254, .6 > 171.6  No statin indicated given age <40, LDL <190, no comorbid condition such as diabetes, no heart disease  Recommend healthy lifestyle  Recheck lipid panel next year      4. Elevated ALT measurement  Assessment & Plan:  ALT 73, remainder of LFT normal, historically normal  Discussed healthy diet, safe alcohol guidelines  Recheck LFT next year  If elevation persists/worsens, consider further w/u      5. Elevated BP without diagnosis of hypertension  Assessment & Plan:  /100 today  Historically normal  Continue to monitor      6. Overweight with body mass index (BMI) of 28 to 28.9 in adult  Assessment & Plan:  BMI 28.12, weight loss about 2 lbs since last visit  Continue healthy lifestyle      7. Multiple nevi  Assessment & Plan:  Follows with outside Dermatologist   Continue regular F/U                    Beatrice Lopez MD  [facility]  08/16/2024

## 2024-08-16 ENCOUNTER — HOSPITAL ENCOUNTER (OUTPATIENT)
Dept: CARDIOLOGY | Facility: CLINIC | Age: 34
Discharge: HOME | End: 2024-08-16
Payer: COMMERCIAL

## 2024-08-16 ENCOUNTER — OFFICE VISIT (OUTPATIENT)
Dept: INTERNAL MEDICINE | Facility: CLINIC | Age: 34
End: 2024-08-16
Payer: COMMERCIAL

## 2024-08-16 ENCOUNTER — CLINICAL SUPPORT (OUTPATIENT)
Dept: INTERNAL MEDICINE | Facility: CLINIC | Age: 34
End: 2024-08-16
Payer: COMMERCIAL

## 2024-08-16 VITALS
BODY MASS INDEX: 28.06 KG/M2 | SYSTOLIC BLOOD PRESSURE: 144 MMHG | OXYGEN SATURATION: 98 % | WEIGHT: 196 LBS | HEIGHT: 70 IN | DIASTOLIC BLOOD PRESSURE: 100 MMHG | HEART RATE: 66 BPM

## 2024-08-16 DIAGNOSIS — R74.01 ELEVATED ALT MEASUREMENT: ICD-10-CM

## 2024-08-16 DIAGNOSIS — Z00.00 ROUTINE MEDICAL EXAM: ICD-10-CM

## 2024-08-16 DIAGNOSIS — E78.2 MODERATE MIXED HYPERLIPIDEMIA NOT REQUIRING STATIN THERAPY: ICD-10-CM

## 2024-08-16 DIAGNOSIS — R03.0 ELEVATED BP WITHOUT DIAGNOSIS OF HYPERTENSION: ICD-10-CM

## 2024-08-16 DIAGNOSIS — Z00.00 ANNUAL PHYSICAL EXAM: Primary | ICD-10-CM

## 2024-08-16 DIAGNOSIS — Z00.00 HEALTH MAINTENANCE EXAMINATION: ICD-10-CM

## 2024-08-16 DIAGNOSIS — D22.9 MULTIPLE NEVI: ICD-10-CM

## 2024-08-16 DIAGNOSIS — E66.3 OVERWEIGHT WITH BODY MASS INDEX (BMI) OF 28 TO 28.9 IN ADULT: ICD-10-CM

## 2024-08-16 PROBLEM — S86.012A ACHILLES RUPTURE, LEFT: Status: RESOLVED | Noted: 2022-06-28 | Resolved: 2024-08-16

## 2024-08-16 LAB
ALBUMIN SERPL BCP-MCNC: 4.4 G/DL (ref 3.5–5.2)
ALP SERPL-CCNC: 79 U/L (ref 55–135)
ALT SERPL W/O P-5'-P-CCNC: 73 U/L (ref 10–44)
ANION GAP SERPL CALC-SCNC: 5 MMOL/L (ref 8–16)
AST SERPL-CCNC: 40 U/L (ref 10–40)
BILIRUB SERPL-MCNC: 0.7 MG/DL (ref 0.1–1)
BUN SERPL-MCNC: 13 MG/DL (ref 6–20)
CALCIUM SERPL-MCNC: 10.2 MG/DL (ref 8.7–10.5)
CHLORIDE SERPL-SCNC: 104 MMOL/L (ref 95–110)
CHOLEST SERPL-MCNC: 254 MG/DL (ref 120–199)
CHOLEST/HDLC SERPL: 3.9 {RATIO} (ref 2–5)
CO2 SERPL-SCNC: 30 MMOL/L (ref 23–29)
CREAT SERPL-MCNC: 0.8 MG/DL (ref 0.5–1.4)
ERYTHROCYTE [DISTWIDTH] IN BLOOD BY AUTOMATED COUNT: 11.8 % (ref 11.5–14.5)
EST. GFR  (NO RACE VARIABLE): >60 ML/MIN/1.73 M^2
ESTIMATED AVG GLUCOSE: 100 MG/DL (ref 68–131)
GLUCOSE SERPL-MCNC: 113 MG/DL (ref 70–110)
HBA1C MFR BLD: 5.1 % (ref 4–5.6)
HCT VFR BLD AUTO: 45.2 % (ref 40–54)
HDLC SERPL-MCNC: 65 MG/DL (ref 40–75)
HDLC SERPL: 25.6 % (ref 20–50)
HGB BLD-MCNC: 15.7 G/DL (ref 14–18)
LDLC SERPL CALC-MCNC: 171.6 MG/DL (ref 63–159)
MCH RBC QN AUTO: 30.8 PG (ref 27–31)
MCHC RBC AUTO-ENTMCNC: 34.7 G/DL (ref 32–36)
MCV RBC AUTO: 89 FL (ref 82–98)
NONHDLC SERPL-MCNC: 189 MG/DL
OHS QRS DURATION: 98 MS
OHS QTC CALCULATION: 399 MS
PLATELET # BLD AUTO: 207 K/UL (ref 150–450)
PMV BLD AUTO: 10.7 FL (ref 9.2–12.9)
POTASSIUM SERPL-SCNC: 4.8 MMOL/L (ref 3.5–5.1)
PROT SERPL-MCNC: 7.6 G/DL (ref 6–8.4)
RBC # BLD AUTO: 5.09 M/UL (ref 4.6–6.2)
SODIUM SERPL-SCNC: 139 MMOL/L (ref 136–145)
TRIGL SERPL-MCNC: 87 MG/DL (ref 30–150)
TSH SERPL DL<=0.005 MIU/L-ACNC: 1.98 UIU/ML (ref 0.4–4)
WBC # BLD AUTO: 7.22 K/UL (ref 3.9–12.7)

## 2024-08-16 PROCEDURE — 93010 ELECTROCARDIOGRAM REPORT: CPT | Mod: S$GLB,,, | Performed by: INTERNAL MEDICINE

## 2024-08-16 PROCEDURE — 80061 LIPID PANEL: CPT | Performed by: STUDENT IN AN ORGANIZED HEALTH CARE EDUCATION/TRAINING PROGRAM

## 2024-08-16 PROCEDURE — 3080F DIAST BP >= 90 MM HG: CPT | Mod: CPTII,S$GLB,, | Performed by: STUDENT IN AN ORGANIZED HEALTH CARE EDUCATION/TRAINING PROGRAM

## 2024-08-16 PROCEDURE — 83036 HEMOGLOBIN GLYCOSYLATED A1C: CPT | Performed by: STUDENT IN AN ORGANIZED HEALTH CARE EDUCATION/TRAINING PROGRAM

## 2024-08-16 PROCEDURE — 36415 COLL VENOUS BLD VENIPUNCTURE: CPT | Performed by: STUDENT IN AN ORGANIZED HEALTH CARE EDUCATION/TRAINING PROGRAM

## 2024-08-16 PROCEDURE — 80053 COMPREHEN METABOLIC PANEL: CPT | Performed by: STUDENT IN AN ORGANIZED HEALTH CARE EDUCATION/TRAINING PROGRAM

## 2024-08-16 PROCEDURE — 99999 PR PBB SHADOW E&M-EST. PATIENT-LVL III: CPT | Mod: PBBFAC,,, | Performed by: STUDENT IN AN ORGANIZED HEALTH CARE EDUCATION/TRAINING PROGRAM

## 2024-08-16 PROCEDURE — 3008F BODY MASS INDEX DOCD: CPT | Mod: CPTII,S$GLB,, | Performed by: STUDENT IN AN ORGANIZED HEALTH CARE EDUCATION/TRAINING PROGRAM

## 2024-08-16 PROCEDURE — 99395 PREV VISIT EST AGE 18-39: CPT | Mod: S$GLB,,, | Performed by: STUDENT IN AN ORGANIZED HEALTH CARE EDUCATION/TRAINING PROGRAM

## 2024-08-16 PROCEDURE — 84443 ASSAY THYROID STIM HORMONE: CPT | Performed by: STUDENT IN AN ORGANIZED HEALTH CARE EDUCATION/TRAINING PROGRAM

## 2024-08-16 PROCEDURE — 1159F MED LIST DOCD IN RCRD: CPT | Mod: CPTII,S$GLB,, | Performed by: STUDENT IN AN ORGANIZED HEALTH CARE EDUCATION/TRAINING PROGRAM

## 2024-08-16 PROCEDURE — 85027 COMPLETE CBC AUTOMATED: CPT | Performed by: STUDENT IN AN ORGANIZED HEALTH CARE EDUCATION/TRAINING PROGRAM

## 2024-08-16 PROCEDURE — 1160F RVW MEDS BY RX/DR IN RCRD: CPT | Mod: CPTII,S$GLB,, | Performed by: STUDENT IN AN ORGANIZED HEALTH CARE EDUCATION/TRAINING PROGRAM

## 2024-08-16 PROCEDURE — 3044F HG A1C LEVEL LT 7.0%: CPT | Mod: CPTII,S$GLB,, | Performed by: STUDENT IN AN ORGANIZED HEALTH CARE EDUCATION/TRAINING PROGRAM

## 2024-08-16 PROCEDURE — 3077F SYST BP >= 140 MM HG: CPT | Mod: CPTII,S$GLB,, | Performed by: STUDENT IN AN ORGANIZED HEALTH CARE EDUCATION/TRAINING PROGRAM

## 2024-08-16 PROCEDURE — 93005 ELECTROCARDIOGRAM TRACING: CPT | Mod: S$GLB,,, | Performed by: STUDENT IN AN ORGANIZED HEALTH CARE EDUCATION/TRAINING PROGRAM

## 2024-08-16 PROCEDURE — 99999 PR PBB SHADOW E&M-EST. PATIENT-LVL I: CPT | Mod: PBBFAC,,,

## 2024-08-16 NOTE — LETTER
August 19, 2024    Stephan Schwartz  [address] [facility] [address]             [facility]  [address]  [facility] [address]  Phone: [phone]  Fax: [phone] Dear Mr. Schwartz:      It was a pleasure seeing you in clinic for your Executive Health exam on 8/16/2024. Enclosed is a copy of the clinic note detailing the history, physical exam findings, laboratory studies, and recommendations at that time. Thank you for allowing me to participate in your medical care.             If you have any questions or concerns, please don't hesitate to call.    Sincerely,        Beatrice Lopez MD

## 2024-08-16 NOTE — ASSESSMENT & PLAN NOTE
ALT 73, remainder of LFT normal, historically normal  Discussed healthy diet, safe alcohol guidelines  Recheck LFT next year  If elevation persists/worsens, consider further w/u

## 2024-08-16 NOTE — ASSESSMENT & PLAN NOTE
Lipid panel slightly improved from prior - cholesterol 272 > 254, .6 > 171.6  No statin indicated given age <40, LDL <190, no comorbid condition such as diabetes, no heart disease  Recommend healthy lifestyle  Recheck lipid panel next year

## 2025-06-17 DIAGNOSIS — Z00.00 ROUTINE MEDICAL EXAM: Primary | ICD-10-CM

## 2025-08-25 DIAGNOSIS — Z00.00 ROUTINE MEDICAL EXAM: Primary | ICD-10-CM

## (undated) DEVICE — BNDG COFLEX FOAM LF2 ST 4X5YD

## (undated) DEVICE — BANDAGE MATRIX HK LOOP 4IN 5YD

## (undated) DEVICE — PAD ABD 8X10 STERILE

## (undated) DEVICE — BANDAGE ESMARK 6X12

## (undated) DEVICE — SPLINT PLASTER FAST SET 5X30IN

## (undated) DEVICE — ELECTRODE REM PLYHSV RETURN 9

## (undated) DEVICE — DRAPE SURG W/TWL 17 5/8X23

## (undated) DEVICE — NDL HYPODERMIC SAF 22G 1.5IN

## (undated) DEVICE — PAD CAST SPECIALIST STRL 6

## (undated) DEVICE — BANDAGE ACE NON LATEX 3IN

## (undated) DEVICE — PADDING WYTEX UNDRCST 6INX4YD

## (undated) DEVICE — TOWEL OR DISP STRL BLUE 4/PK

## (undated) DEVICE — SUT CTD VICRYL CT-1 UND BR

## (undated) DEVICE — UNDERGLOVE BIOGEL PI SZ 6.5 LF

## (undated) DEVICE — PAD CAST SPECIALIST STRL 4

## (undated) DEVICE — GAUZE SPONGE 4X4 12PLY

## (undated) DEVICE — GLOVE BIOGEL PI MICRO SZ 6.5

## (undated) DEVICE — DRESSING SPONGE 8PLY 4X4 STRL

## (undated) DEVICE — DRESSING N ADH OIL EMUL 3X3

## (undated) DEVICE — BLADE SAGITTA 5/BX

## (undated) DEVICE — SEE MEDLINE ITEM 146298

## (undated) DEVICE — SPONGE GAUZE 16PLY 4X4

## (undated) DEVICE — TOURNIQUET SB QC DP 34X4IN

## (undated) DEVICE — STOCKINETTE TUBULAR 2PL 6 X 4

## (undated) DEVICE — DRAPE STERI-DRAPE 1000 17X11IN

## (undated) DEVICE — STOCKINET DELTA NET 6X25 YDS

## (undated) DEVICE — SUT VICRYL 2-0 CT-2 VCP269H

## (undated) DEVICE — BANDAGE ACE ELASTIC 6"

## (undated) DEVICE — SUT MONOCRYL 3-0 PS-2 UND

## (undated) DEVICE — SUT ETHILON 3-0 PS2 18 BLK

## (undated) DEVICE — BANDAGE MATRIX HK LOOP 6IN 5YD

## (undated) DEVICE — TRAY MINOR ORTHO

## (undated) DEVICE — BLADE MICRO REC. LARGE CROSS C

## (undated) DEVICE — SEE MEDLINE ITEM 157150